# Patient Record
Sex: FEMALE | Race: BLACK OR AFRICAN AMERICAN | NOT HISPANIC OR LATINO | Employment: OTHER | RURAL
[De-identification: names, ages, dates, MRNs, and addresses within clinical notes are randomized per-mention and may not be internally consistent; named-entity substitution may affect disease eponyms.]

---

## 2020-08-13 ENCOUNTER — HISTORICAL (OUTPATIENT)
Dept: ADMINISTRATIVE | Facility: HOSPITAL | Age: 65
End: 2020-08-13

## 2020-08-18 ENCOUNTER — HISTORICAL (OUTPATIENT)
Dept: ADMINISTRATIVE | Facility: HOSPITAL | Age: 65
End: 2020-08-18

## 2021-10-20 ENCOUNTER — HOSPITAL ENCOUNTER (OUTPATIENT)
Dept: RADIOLOGY | Facility: HOSPITAL | Age: 66
Discharge: HOME | End: 2021-10-20
Attending: NURSE PRACTITIONER
Payer: COMMERCIAL

## 2021-10-20 VITALS — HEIGHT: 64 IN | WEIGHT: 250 LBS | BODY MASS INDEX: 42.68 KG/M2

## 2021-10-20 DIAGNOSIS — Z12.31 BREAST CANCER SCREENING BY MAMMOGRAM: ICD-10-CM

## 2021-10-20 PROCEDURE — 77067 SCR MAMMO BI INCL CAD: CPT | Mod: TC

## 2021-10-20 PROCEDURE — 77067 MAMMO DIGITAL SCREENING BILAT: ICD-10-PCS | Mod: 26,,, | Performed by: RADIOLOGY

## 2021-10-20 PROCEDURE — 77067 SCR MAMMO BI INCL CAD: CPT | Mod: 26,,, | Performed by: RADIOLOGY

## 2021-11-18 ENCOUNTER — OFFICE VISIT (OUTPATIENT)
Dept: DERMATOLOGY | Facility: CLINIC | Age: 66
End: 2021-11-18
Payer: COMMERCIAL

## 2021-11-18 VITALS — BODY MASS INDEX: 42.68 KG/M2 | HEIGHT: 64 IN | WEIGHT: 250 LBS | RESPIRATION RATE: 16 BRPM

## 2021-11-18 DIAGNOSIS — L30.1 DYSHIDROTIC ECZEMA: Primary | ICD-10-CM

## 2021-11-18 DIAGNOSIS — R21 RASH AND NONSPECIFIC SKIN ERUPTION: ICD-10-CM

## 2021-11-18 LAB
BACTERIA HYPHAE, POC: NEGATIVE
YEAST, POC: NEGATIVE

## 2021-11-18 PROCEDURE — 99204 OFFICE O/P NEW MOD 45 MIN: CPT | Mod: ,,, | Performed by: STUDENT IN AN ORGANIZED HEALTH CARE EDUCATION/TRAINING PROGRAM

## 2021-11-18 PROCEDURE — 99204 PR OFFICE/OUTPT VISIT, NEW, LEVL IV, 45-59 MIN: ICD-10-PCS | Mod: ,,, | Performed by: STUDENT IN AN ORGANIZED HEALTH CARE EDUCATION/TRAINING PROGRAM

## 2021-11-18 RX ORDER — METFORMIN HYDROCHLORIDE 500 MG/1
TABLET, EXTENDED RELEASE ORAL
COMMUNITY
Start: 2021-08-18

## 2021-11-18 RX ORDER — TRIAMCINOLONE ACETONIDE 1 MG/G
CREAM TOPICAL
COMMUNITY
Start: 2021-06-22

## 2021-11-18 RX ORDER — CETIRIZINE HYDROCHLORIDE 10 MG/1
TABLET ORAL
COMMUNITY
Start: 2021-08-18

## 2021-11-18 RX ORDER — PREDNISONE 10 MG/1
TABLET ORAL
COMMUNITY
Start: 2021-08-18

## 2021-11-18 RX ORDER — ROSUVASTATIN CALCIUM 5 MG/1
TABLET, COATED ORAL
COMMUNITY
Start: 2021-09-15

## 2021-11-18 RX ORDER — AMLODIPINE BESYLATE 10 MG/1
TABLET ORAL
COMMUNITY
Start: 2021-09-08

## 2021-11-18 RX ORDER — CLOBETASOL PROPIONATE 0.5 MG/G
OINTMENT TOPICAL
Qty: 60 G | Refills: 2 | Status: SHIPPED | OUTPATIENT
Start: 2021-11-18 | End: 2023-06-29 | Stop reason: SDUPTHER

## 2021-11-18 RX ORDER — BETAMETHASONE DIPROPIONATE 0.5 MG/G
CREAM TOPICAL
COMMUNITY
Start: 2021-08-18

## 2021-11-18 RX ORDER — MUPIROCIN 20 MG/G
OINTMENT TOPICAL
COMMUNITY
Start: 2021-09-14

## 2021-11-18 RX ORDER — LISINOPRIL AND HYDROCHLOROTHIAZIDE 20; 25 MG/1; MG/1
TABLET ORAL
COMMUNITY
Start: 2021-09-21

## 2022-11-18 ENCOUNTER — HOSPITAL ENCOUNTER (EMERGENCY)
Facility: HOSPITAL | Age: 67
Discharge: HOME | End: 2022-11-18
Payer: COMMERCIAL

## 2022-11-18 VITALS
BODY MASS INDEX: 36.96 KG/M2 | DIASTOLIC BLOOD PRESSURE: 84 MMHG | HEIGHT: 66 IN | RESPIRATION RATE: 15 BRPM | WEIGHT: 230 LBS | TEMPERATURE: 99 F | OXYGEN SATURATION: 95 % | SYSTOLIC BLOOD PRESSURE: 144 MMHG | HEART RATE: 64 BPM

## 2022-11-18 DIAGNOSIS — M79.5 FOREIGN BODY (FB) IN SOFT TISSUE: ICD-10-CM

## 2022-11-18 DIAGNOSIS — R09.A2 FOREIGN BODY SENSATION IN THROAT: Primary | ICD-10-CM

## 2022-11-18 PROCEDURE — 99283 EMERGENCY DEPT VISIT LOW MDM: CPT

## 2022-11-18 PROCEDURE — 99282 EMERGENCY DEPT VISIT SF MDM: CPT

## 2022-11-18 NOTE — ED PROVIDER NOTES
"Encounter Date: 11/18/2022       History     Chief Complaint   Patient presents with    Foreign Body In Throat     Feels like something is stuck in her throat.  Started after she ate soup and peanut butter and jelly sandwich for supper about 6 pm.  Able to swallow saliva.  No shortness of breath.     Arianna Galicia is a 66 yo AAF that presents with c/o foreign body sensation in throat since 1800 yesterday.  Pt states she ate soup with neck bones and a peanut butter and jelly sandwich.  When she was eating the last bite of her sandwich is when she felt the foreign body sensation and vomited.  States she does not think she swallowed any of the neck bone.  Pt denies CP, SOB, or difficulty swallowing.  Does report a cough but reports that is chronic.  States she has been drinking fluids without difficulty.  Able to speak in full sentences without difficulty.  No difficulty managing oral secretions and reports no change in her voice.  She denies throat being sore just states "there is something stuck in my throat".    The history is provided by the patient.   Review of patient's allergies indicates:  No Known Allergies  Past Medical History:   Diagnosis Date    Diabetes mellitus, type 2     HTN (hypertension)      Past Surgical History:   Procedure Laterality Date    HYSTERECTOMY       Family History   Problem Relation Age of Onset    Kidney cancer Mother     Diabetes Sister     Heart disease Sister      Social History     Tobacco Use    Smoking status: Never    Smokeless tobacco: Never   Substance Use Topics    Alcohol use: Never    Drug use: Never     Review of Systems   Constitutional: Negative.    HENT:  Negative for drooling, facial swelling, postnasal drip, rhinorrhea, sore throat, trouble swallowing and voice change.    Eyes: Negative.    Respiratory:  Positive for cough (chronic). Negative for chest tightness, shortness of breath, wheezing and stridor.    Cardiovascular:  Negative for chest pain. "   Gastrointestinal:  Positive for vomiting (after eating yesterday). Negative for diarrhea and nausea.   Endocrine: Negative.    Genitourinary: Negative.    Musculoskeletal: Negative.    Allergic/Immunologic: Negative for environmental allergies and food allergies.   Neurological:  Negative for dizziness, syncope, speech difficulty, weakness, light-headedness and numbness.   Psychiatric/Behavioral: Negative.       Physical Exam     Initial Vitals [11/18/22 0444]   BP Pulse Resp Temp SpO2   (!) 189/72 (!) 55 14 98.5 °F (36.9 °C) 95 %      MAP       --         Physical Exam    Constitutional: She appears well-developed and well-nourished. She is not diaphoretic. No distress.   HENT:   Head: Normocephalic and atraumatic.   Right Ear: External ear normal.   Left Ear: External ear normal.   Nose: Nose normal.   Mouth/Throat: Uvula is midline, oropharynx is clear and moist and mucous membranes are normal. No oral lesions. No uvula swelling. No oropharyngeal exudate, posterior oropharyngeal edema or posterior oropharyngeal erythema.   Eyes: EOM are normal. Pupils are equal, round, and reactive to light.   Neck: Neck supple. No tracheal deviation present.   Normal range of motion.  Cardiovascular:  Regular rhythm, normal heart sounds and intact distal pulses.           Pulmonary/Chest: Breath sounds normal. No stridor. No respiratory distress. She has no wheezes.   Abdominal: Abdomen is soft. Bowel sounds are normal.   Musculoskeletal:         General: Normal range of motion.      Cervical back: Normal range of motion and neck supple.     Lymphadenopathy:     She has no cervical adenopathy.   Neurological: She is alert and oriented to person, place, and time. She has normal strength. GCS score is 15. GCS eye subscore is 4. GCS verbal subscore is 5. GCS motor subscore is 6.   Skin: Skin is warm and dry.   Psychiatric: She has a normal mood and affect. Her behavior is normal. Judgment and thought content normal.       Medical  Screening Exam   See Full Note    ED Course   Procedures  Labs Reviewed - No data to display       Imaging Results              X-Ray Neck Soft Tissue (In process)                      Medications - No data to display  Medical Decision Making:   ED Management:  -Will perform soft tissue neck to rule out foreign body                    - xray no fracture of dislocation, no tracheal stenosis                 Clinical Impression:   Final diagnoses:  [M79.5] Foreign body (FB) in soft tissue - possible/rule out  [R09.89] Foreign body sensation in throat (Primary)        ED Disposition Condition    Discharge Stable          ED Prescriptions    None       Follow-up Information       Follow up With Specialties Details Why Contact Info    Mary Roe NP Family Medicine   [address] [place] [address] Ext  [address]  [place] [address]  [phone]               Marcelina Calvert MD  11/18/22 0698

## 2022-11-19 ENCOUNTER — TELEPHONE (OUTPATIENT)
Dept: EMERGENCY MEDICINE | Facility: HOSPITAL | Age: 67
End: 2022-11-19
Payer: COMMERCIAL

## 2022-11-19 NOTE — TELEPHONE ENCOUNTER
Patient contacted for f/u to ED visit. States they are feeling better. Instructed to follow up with PCP if needed.

## 2023-01-25 ENCOUNTER — HOSPITAL ENCOUNTER (OUTPATIENT)
Dept: RADIOLOGY | Facility: HOSPITAL | Age: 68
Discharge: HOME | End: 2023-01-25
Attending: NURSE PRACTITIONER
Payer: MEDICARE

## 2023-01-25 DIAGNOSIS — Z12.31 BREAST CANCER SCREENING BY MAMMOGRAM: ICD-10-CM

## 2023-01-25 PROCEDURE — 77067 SCR MAMMO BI INCL CAD: CPT | Mod: TC

## 2023-06-29 ENCOUNTER — OFFICE VISIT (OUTPATIENT)
Dept: DERMATOLOGY | Facility: CLINIC | Age: 68
End: 2023-06-29
Payer: MEDICARE

## 2023-06-29 DIAGNOSIS — L30.1 DYSHIDROTIC ECZEMA: ICD-10-CM

## 2023-06-29 PROCEDURE — 99213 OFFICE O/P EST LOW 20 MIN: CPT | Mod: ,,, | Performed by: STUDENT IN AN ORGANIZED HEALTH CARE EDUCATION/TRAINING PROGRAM

## 2023-06-29 PROCEDURE — 1159F PR MEDICATION LIST DOCUMENTED IN MEDICAL RECORD: ICD-10-PCS | Mod: CPTII,,, | Performed by: STUDENT IN AN ORGANIZED HEALTH CARE EDUCATION/TRAINING PROGRAM

## 2023-06-29 PROCEDURE — 4010F PR ACE/ARB THEARPY RXD/TAKEN: ICD-10-PCS | Mod: CPTII,,, | Performed by: STUDENT IN AN ORGANIZED HEALTH CARE EDUCATION/TRAINING PROGRAM

## 2023-06-29 PROCEDURE — 1159F MED LIST DOCD IN RCRD: CPT | Mod: CPTII,,, | Performed by: STUDENT IN AN ORGANIZED HEALTH CARE EDUCATION/TRAINING PROGRAM

## 2023-06-29 PROCEDURE — 4010F ACE/ARB THERAPY RXD/TAKEN: CPT | Mod: CPTII,,, | Performed by: STUDENT IN AN ORGANIZED HEALTH CARE EDUCATION/TRAINING PROGRAM

## 2023-06-29 PROCEDURE — 99213 PR OFFICE/OUTPT VISIT, EST, LEVL III, 20-29 MIN: ICD-10-PCS | Mod: ,,, | Performed by: STUDENT IN AN ORGANIZED HEALTH CARE EDUCATION/TRAINING PROGRAM

## 2023-06-29 RX ORDER — CLOBETASOL PROPIONATE 0.5 MG/G
OINTMENT TOPICAL
Qty: 60 G | Refills: 2 | Status: SHIPPED | OUTPATIENT
Start: 2023-06-29

## 2023-06-29 NOTE — PROGRESS NOTES
[facility]  Sonu Garcia MD    [address] [place] [address], [place] [address]  [phone]    Fax: [phone]    Patient Name: Arianna Galicia  Medical Record Number: [id number]  PCP: Mary Roe NP  Age: 68 y.o. : 1955  Contact: [phone] (home)     History of Present Illness:     Arianna Galicia is a 68 y.o.  female here for follow up of dyshidrotic eczema. Last seen 21 current therapies includes clobetasol which did improve initially. She has been accidentally using mupirocin instead of clobetasol so it has not improved recently.     The patient has no other concerns today.    Review of Systems:     Unremarkable other than mentioned above.     Physical Exam:     General: Relaxed, oriented, alert    Skin examination of the scalp, face, neck, chest, back, abdomen, upper extremities and lower extremities were normal except for as listed below      Assessment and Plan:     1. Dyshidrotic Eczema   - tapioca-like vesicles, fissures, and erythematous eczematous patches on the hands and feet    Plan:  Continue clobetasol to hand BID under occlusion     Counseling  Skin care: Patient should wash using lukewarm water with a mild cleanser and moisturize immediately after. Emollients should be applied at least 2-3 times daily. Avoid scented detergents or fabric softeners. Keep fingernails short. Avoid excessive hand washing.  Expectations: The patient is aware that dyshidrotic eczema is chronic in nature and can improve with moisturizers and topical steroids and worsen with stress, scented soaps, detergents, scratching, dry skin, changes in weather and skin infections.  Contact office if: Dyshidrotic eczema worsens or fails to improve despite several weeks of treatment; patient develops skin infections (such as: yellow honey colored crusts or cold sores).           Return to clinic PRN    AVS printed with patient instructions     Sonu Garcia MD   Mohs  Surgery/Dermatologic Oncology  Dermatology

## 2023-07-19 ENCOUNTER — CLINICAL SUPPORT (OUTPATIENT)
Dept: DERMATOLOGY | Facility: CLINIC | Age: 68
End: 2023-07-19
Payer: MEDICARE

## 2023-07-19 DIAGNOSIS — L30.1 DYSHIDROTIC ECZEMA: Primary | ICD-10-CM

## 2023-07-19 PROCEDURE — 99213 OFFICE O/P EST LOW 20 MIN: CPT | Mod: ,,, | Performed by: STUDENT IN AN ORGANIZED HEALTH CARE EDUCATION/TRAINING PROGRAM

## 2023-07-19 PROCEDURE — 99213 PR OFFICE/OUTPT VISIT, EST, LEVL III, 20-29 MIN: ICD-10-PCS | Mod: ,,, | Performed by: STUDENT IN AN ORGANIZED HEALTH CARE EDUCATION/TRAINING PROGRAM

## 2023-07-19 NOTE — PROGRESS NOTES
[facility]  Sonu Garcia MD    [address] [address]  [phone]    Fax: [phone]    Patient Name: Arianna Galicia  Medical Record Number: [id number]  PCP: Mary Roe NP  Age: 68 y.o. : 1955  Contact: [phone] (home)     History of Present Illness:     Arianna Galicia is a 68 y.o.  female here for follow up of dyshidrotic eczema. She has been using clobetasol for the past few weeks with minimal improvement. However, she does use hand sanitizers, cleaning chemicals, and dish soap without gloves.     The patient has no other concerns today.    Review of Systems:     Unremarkable other than mentioned above.     Physical Exam:     General: Relaxed, oriented, alert    Skin examination of the scalp, face, neck, chest, back, abdomen, upper extremities and lower extremities were normal except for as listed below      Assessment and Plan:     1. Dyshidrotic Eczema   - tapioca-like vesicles, fissures, and erythematous eczematous patches on the hands and feet    Plan:  - stop use of dish soap and hand sanitizers   - clobetasol bid PRN  - update us in 3 weeks regarding improvement      Counseling  Skin care: Patient should wash using lukewarm water with a mild cleanser and moisturize immediately after. Emollients should be applied at least 2-3 times daily. Avoid scented detergents or fabric softeners. Keep fingernails short. Avoid excessive hand washing.  Expectations: The patient is aware that dyshidrotic eczema is chronic in nature and can improve with moisturizers and topical steroids and worsen with stress, scented soaps, detergents, scratching, dry skin, changes in weather and skin infections.  Contact office if: Dyshidrotic eczema worsens or fails to improve despite several weeks of treatment; patient develops skin infections (such as: yellow honey colored crusts or cold sores).        Return to clinic in 3 months     AVS printed with patient  instructions     Sonu Garcia MD   Mohs Surgery/Dermatologic Oncology  Dermatology

## 2024-01-16 DIAGNOSIS — M54.50 LOW BACK PAIN: Primary | ICD-10-CM

## 2024-01-31 ENCOUNTER — HOSPITAL ENCOUNTER (OUTPATIENT)
Dept: RADIOLOGY | Facility: HOSPITAL | Age: 69
Discharge: HOME | End: 2024-01-31
Attending: NURSE PRACTITIONER
Payer: MEDICARE

## 2024-01-31 VITALS — BODY MASS INDEX: 40.18 KG/M2 | HEIGHT: 66 IN | WEIGHT: 250 LBS

## 2024-01-31 DIAGNOSIS — Z12.31 ENCOUNTER FOR SCREENING MAMMOGRAM FOR MALIGNANT NEOPLASM OF BREAST: ICD-10-CM

## 2024-01-31 PROCEDURE — 77067 SCR MAMMO BI INCL CAD: CPT | Mod: TC

## 2024-02-14 ENCOUNTER — HOSPITAL ENCOUNTER (OUTPATIENT)
Dept: RADIOLOGY | Facility: HOSPITAL | Age: 69
Discharge: HOME | End: 2024-02-14
Attending: NURSE PRACTITIONER
Payer: MEDICARE

## 2024-02-14 ENCOUNTER — OFFICE VISIT (OUTPATIENT)
Dept: SPINE | Facility: CLINIC | Age: 69
End: 2024-02-14
Payer: MEDICARE

## 2024-02-14 DIAGNOSIS — M25.551 BILATERAL HIP PAIN: Primary | ICD-10-CM

## 2024-02-14 DIAGNOSIS — M25.552 BILATERAL HIP PAIN: Primary | ICD-10-CM

## 2024-02-14 DIAGNOSIS — M54.16 LUMBAR RADICULOPATHY: ICD-10-CM

## 2024-02-14 DIAGNOSIS — M25.552 BILATERAL HIP PAIN: ICD-10-CM

## 2024-02-14 DIAGNOSIS — M25.551 BILATERAL HIP PAIN: ICD-10-CM

## 2024-02-14 DIAGNOSIS — M54.16 LUMBAR RADICULOPATHY: Primary | ICD-10-CM

## 2024-02-14 PROCEDURE — 72110 X-RAY EXAM L-2 SPINE 4/>VWS: CPT | Mod: TC

## 2024-02-14 PROCEDURE — 73522 X-RAY EXAM HIPS BI 3-4 VIEWS: CPT | Mod: TC

## 2024-02-14 PROCEDURE — 73522 X-RAY EXAM HIPS BI 3-4 VIEWS: CPT | Mod: 26,,, | Performed by: RADIOLOGY

## 2024-02-14 PROCEDURE — 99204 OFFICE O/P NEW MOD 45 MIN: CPT | Mod: S$PBB,,, | Performed by: NURSE PRACTITIONER

## 2024-02-14 PROCEDURE — 72110 X-RAY EXAM L-2 SPINE 4/>VWS: CPT | Mod: 26,,, | Performed by: STUDENT IN AN ORGANIZED HEALTH CARE EDUCATION/TRAINING PROGRAM

## 2024-02-14 PROCEDURE — 99213 OFFICE O/P EST LOW 20 MIN: CPT | Mod: PBBFAC,25 | Performed by: NURSE PRACTITIONER

## 2024-02-14 PROCEDURE — 4010F ACE/ARB THERAPY RXD/TAKEN: CPT | Mod: CPTII,,, | Performed by: NURSE PRACTITIONER

## 2024-02-14 PROCEDURE — 1159F MED LIST DOCD IN RCRD: CPT | Mod: CPTII,,, | Performed by: NURSE PRACTITIONER

## 2024-02-14 NOTE — PROGRESS NOTES
MDM/time:  Greater than 45 minutes spent on this encounter including 15 minutes reviewing imaging and notes, 20 minutes with the patient, 10 minutes documentation    ASSESSMENT:  68 y.o. female with thoracolumbar degenerative scoliosis with lumbar spondylolisthesis at L3-4 and radiculopathy, severe right hip OA and mod-severe Left hip OA    PLAN:  Referral to Orthopedic for bilateral hip OA - discuss surgical options   Follow up 3 months     HPI:  68 y.o. female here for evaluation of right-sided hip/groin pain over the past year.  Patient denies injury but reports approximately a year ago pain started and has progressed since that time.  She is now having to use a cane to ambulate due to pain in unstable gait.  Patient denies difficulty with  strength.  No bladder bowel incontinence.  Unable to stand or walk for any length of time due to pain in the right hip and groin area.  Currently takes meloxicam as needed for pain.  No recent physical therapy.  No prior pain management.  No recent MRI.  No prior spine surgery.  Patient is not a smoker.    IMAGING:  X-Ray Lumbar 4-5 View including Bending Views  Narrative: EXAMINATION:  XR LUMBAR SPINE 4-5 VIEW WITH BENDING VIEWS    CLINICAL HISTORY:  Radiculopathy, lumbar region    TECHNIQUE:  XR LUMBAR SPINE 4-5 VIEW WITH BENDING VIEWS    COMPARISON:  2015    FINDINGS:  No acute fracture or dislocation.  No dynamic instability.  Grade 1 retrolisthesis L3 on L4 similar on the flexion and extension views, unchanged from 2015 comparison.  Mild rightward scoliosis.  Moderate to severe multilevel degenerative disc disease, progressed from comparison.  Multiple anterior osteophytes, similar.  Multilevel endplate change, similar.  Multilevel moderate spinal canal narrowing, similar.  Multilevel neural foraminal narrowing, grossly similar to comparison.  Impression: No acute fracture or dislocation.  No dynamic instability.  Grade 1 retrolisthesis L3 on L4 similar on the  flexion and extension views, unchanged from 2015 comparison. Mild rightward scoliosis.  Moderate to severe multilevel degenerative disc disease, progressed from comparison. Multiple anterior osteophytes, similar. Multilevel endplate change, similar. Multilevel moderate spinal canal narrowing, similar. Multilevel neural foraminal narrowing, grossly similar to comparison.    Electronically signed by: Nilo Singleton  Date:    02/14/2024  Time:    15:16  X-Ray Hip 3 or 4 views Bilateral  Narrative: EXAMINATION:  XR HIP 3 OR 4 VIEWS BILATERAL    CLINICAL HISTORY:  Pain in right hip    COMPARISON:  None available    TECHNIQUE:  XR HIP 4 VIEWS BILATERAL    FINDINGS:  No evidence of fracture seen.  The alignment of the joints appears normal.  Moderate to severe right hip and mild-to-moderate left hip degenerative change is present.  No soft tissue abnormality is seen.  Impression: Hip osteoarthrosis as described above.    Electronically signed by: Ismael Tracy  Date:    02/14/2024  Time:    14:54     X-Ray Lumbar 4-5 View including Bending Views  Narrative: EXAMINATION:  XR LUMBAR SPINE 4-5 VIEW WITH BENDING VIEWS    CLINICAL HISTORY:  Radiculopathy, lumbar region    TECHNIQUE:  XR LUMBAR SPINE 4-5 VIEW WITH BENDING VIEWS    COMPARISON:  2015    FINDINGS:  No acute fracture or dislocation.  No dynamic instability.  Grade 1 retrolisthesis L3 on L4 similar on the flexion and extension views, unchanged from 2015 comparison.  Mild rightward scoliosis.  Moderate to severe multilevel degenerative disc disease, progressed from comparison.  Multiple anterior osteophytes, similar.  Multilevel endplate change, similar.  Multilevel moderate spinal canal narrowing, similar.  Multilevel neural foraminal narrowing, grossly similar to comparison.  Impression: No acute fracture or dislocation.  No dynamic instability.  Grade 1 retrolisthesis L3 on L4 similar on the flexion and extension views, unchanged from 2015 comparison. Mild  rightward scoliosis.  Moderate to severe multilevel degenerative disc disease, progressed from comparison. Multiple anterior osteophytes, similar. Multilevel endplate change, similar. Multilevel moderate spinal canal narrowing, similar. Multilevel neural foraminal narrowing, grossly similar to comparison.    Electronically signed by: Nilo Singleton  Date:    02/14/2024  Time:    15:16  X-Ray Hip 3 or 4 views Bilateral  Narrative: EXAMINATION:  XR HIP 3 OR 4 VIEWS BILATERAL    CLINICAL HISTORY:  Pain in right hip    COMPARISON:  None available    TECHNIQUE:  XR HIP 4 VIEWS BILATERAL    FINDINGS:  No evidence of fracture seen.  The alignment of the joints appears normal.  Moderate to severe right hip and mild-to-moderate left hip degenerative change is present.  No soft tissue abnormality is seen.  Impression: Hip osteoarthrosis as described above.    Electronically signed by: Ismael Tracy  Date:    02/14/2024  Time:    14:54         Past Medical History:   Diagnosis Date    Diabetes mellitus, type 2     HTN (hypertension)      Past Surgical History:   Procedure Laterality Date    HYSTERECTOMY      OOPHORECTOMY       Social History     Tobacco Use    Smoking status: Never    Smokeless tobacco: Never   Substance Use Topics    Alcohol use: Never    Drug use: Never      Current Outpatient Medications   Medication Instructions    amLODIPine (NORVASC) 10 MG tablet No dose, route, or frequency recorded.    betamethasone dipropionate 0.05 % cream No dose, route, or frequency recorded.    cetirizine (ZYRTEC) 10 MG tablet No dose, route, or frequency recorded.    clobetasol 0.05% (TEMOVATE) 0.05 % Oint Apply to rash on hands twice a day tapering with improvement    lisinopriL-hydrochlorothiazide (PRINZIDE,ZESTORETIC) 20-25 mg Tab No dose, route, or frequency recorded.    metFORMIN (GLUCOPHAGE-XR) 500 MG ER 24hr tablet No dose, route, or frequency recorded.    mupirocin (BACTROBAN) 2 % ointment No dose, route, or frequency  recorded.    predniSONE (DELTASONE) 10 MG tablet No dose, route, or frequency recorded.    rosuvastatin (CRESTOR) 5 MG tablet No dose, route, or frequency recorded.    triamcinolone acetonide 0.1% (KENALOG) 0.1 % cream No dose, route, or frequency recorded.        EXAM:  Constitutional  General Appearance:  There is no height or weight on file to calculate BMI., NAD  Psychiatric   Orientation: Oriented to time, oriented to place, oriented to person  Mood and Affect: Active and alert, normal mood, normal affect  Gait and Station   Appearance:  Antalgic gait to the right walks with a cane, unable to tandem gait, unable to walk on toes, unable to walk on heels    LUMBAR  Musculoskeletal System   Hips: Normal appearance, no leg length discrepancy, decreased motion; left, painful decreased motion; right    Lumbar Spine                   Inspection:  Normal alignment, normal sagittal balance                  Range of motion: Normal flexion, extension, lateral bending, rotation. No pain with range of motion                  Bony Palpation of the Lumbar Spine:  No tenderness of the spinous process, no tenderness of the sacrum, no tenderness of the coccyx                  Bony Palpation of the Right Hip:  No tenderness of the iliac crest, no tenderness of the sciatic notch, no tenderness of the SI joint                  Bony Palpation of the Left Hip:  No tenderness of the iliac crest, no tenderness of the sciatic notch, no tenderness of the SI joint                  Soft Tissue Palpation on the Right:  No tenderness of the paraspinal region, no tenderness of the iliolumbar region                  Soft Tissue Palpation on the Left:  No tenderness of the paraspinal region, no tenderness of the iliolumbar region    Motor Strength   L1 Right:  Hip flexion iliopsoas 3/5    L1 Left:  Hip flexion iliopsoas 4/5              L2-L4 Right:  Knee extension quadriceps 5/5, tibialis anterior 5/5              L2-L4 Left:  Knee extension  quadriceps 5/5, tibialis anterior 5/5   L5 Right:  Extensor hallucis llongus 5/5,    L5 Left:  Extensor hallucis longus 5/5,    S1 Right:  Plantar flexion gastrocnemius 5/5   S1 Left:  Plantar flexion gastrocnemius 5/5    Neurological System   Ankle Reflex Right:  normal   Ankle Reflex Left: normal   Knee Reflex Right:  normal   Knee Reflex Left:  normal   Sensation on the Right:  L2 normal, L3 normal, L4 normal, L5 normal, S1 normal   Sensation on the Left:  L2 normal, L3 normal, L4 normal, L5 normal, S1 normal              Special Test on the Right:  Seated straight leg raising test negative, no clonus of the ankle              Special Test on the Left:  Seated straight leg raising test negative, no clonus of the ankle    Skin   Lumbosacral Spine:  Normal skin    Cardiovascular System   Arterial Pulses Right:  Posterior tibialis normal, dorsalis pedis normal   Arterial Pulses Left:  Posterior tibialis normal, dorsalis pedis normal   Edema Right: None   Edema Left:  None

## 2024-02-20 ENCOUNTER — OFFICE VISIT (OUTPATIENT)
Dept: ORTHOPEDICS | Facility: CLINIC | Age: 69
End: 2024-02-20
Payer: MEDICARE

## 2024-02-20 DIAGNOSIS — M25.552 BILATERAL HIP PAIN: Primary | ICD-10-CM

## 2024-02-20 DIAGNOSIS — M25.511 RIGHT SHOULDER PAIN, UNSPECIFIED CHRONICITY: ICD-10-CM

## 2024-02-20 DIAGNOSIS — M25.551 BILATERAL HIP PAIN: Primary | ICD-10-CM

## 2024-02-20 DIAGNOSIS — M25.552 BILATERAL HIP PAIN: ICD-10-CM

## 2024-02-20 DIAGNOSIS — M16.0 PRIMARY OSTEOARTHRITIS OF BOTH HIPS: Primary | ICD-10-CM

## 2024-02-20 DIAGNOSIS — M25.551 BILATERAL HIP PAIN: ICD-10-CM

## 2024-02-20 PROCEDURE — 99203 OFFICE O/P NEW LOW 30 MIN: CPT | Mod: S$PBB,,, | Performed by: NURSE PRACTITIONER

## 2024-02-20 PROCEDURE — 4010F ACE/ARB THERAPY RXD/TAKEN: CPT | Mod: CPTII,,, | Performed by: NURSE PRACTITIONER

## 2024-02-20 PROCEDURE — 99212 OFFICE O/P EST SF 10 MIN: CPT | Mod: PBBFAC | Performed by: NURSE PRACTITIONER

## 2024-02-20 NOTE — PROGRESS NOTES
HPI:   Arianna Galicia is a pleasant 68 y.o. patient who reports to clinic for evaluation of bilateral hip pain, right being the worst.  She has had right hip pain for close to a year.  No previous treatment.  She has taken Mobic occasionally off and on.  Reports most of her pain is in her right groin.  It is very painful to walk.  Any rotation of the hip is painful..     Injury onset and description: none  Patient's occupation:   This is not a work related injury.   This injury has been non-responsive to conservative care. The pain is worse with repetitive use, and strenuous activity is very difficult.  her pain improves with rest.   PAST MEDICAL HISTORY:   Past Medical History:   Diagnosis Date    Diabetes mellitus, type 2     HTN (hypertension)      PAST SURGICAL HISTORY:   Past Surgical History:   Procedure Laterality Date    HYSTERECTOMY      OOPHORECTOMY       MEDICATIONS:    Current Outpatient Medications:     amLODIPine (NORVASC) 10 MG tablet, , Disp: , Rfl:     betamethasone dipropionate 0.05 % cream, , Disp: , Rfl:     cetirizine (ZYRTEC) 10 MG tablet, , Disp: , Rfl:     clobetasol 0.05% (TEMOVATE) 0.05 % Oint, Apply to rash on hands twice a day tapering with improvement, Disp: 60 g, Rfl: 2    lisinopriL-hydrochlorothiazide (PRINZIDE,ZESTORETIC) 20-25 mg Tab, , Disp: , Rfl:     metFORMIN (GLUCOPHAGE-XR) 500 MG ER 24hr tablet, , Disp: , Rfl:     mupirocin (BACTROBAN) 2 % ointment, , Disp: , Rfl:     predniSONE (DELTASONE) 10 MG tablet, , Disp: , Rfl:     rosuvastatin (CRESTOR) 5 MG tablet, , Disp: , Rfl:     triamcinolone acetonide 0.1% (KENALOG) 0.1 % cream, , Disp: , Rfl:   ALLERGIES:   Review of patient's allergies indicates:  No Known Allergies      PHYSICAL EXAM:  VITAL SIGNS: There were no vitals taken for this visit.  General: Well-developed well-nourished 68 y.o. femalein no acute distress;Cardiovascular: Regular rhythm by palpation of distal pulse, normal color and temperature, no concerning  varicosities on symptomatic side Lungs: No labored breathing or wheezing appreciated Neuro: Alert and oriented ×3 Psychiatric: well oriented to person, place and time, demonstrates normal mood and affect Skin: No rashes, lesions or ulcers, normal temperature, turgor, and texture on uninvolved extremity    General    Constitutional: She is oriented to person, place, and time. She appears well-nourished.   HENT:   Head: Normocephalic and atraumatic.   Eyes: EOM are normal. Pupils are equal, round, and reactive to light.   Neck: Neck supple.   Cardiovascular:  Normal rate, regular rhythm and intact distal pulses.            Pulmonary/Chest: Effort normal. No respiratory distress.   Abdominal: There is no abdominal tenderness. There is no guarding.   Neurological: She is alert and oriented to person, place, and time. She has normal reflexes. She displays normal reflexes. No cranial nerve deficit. She exhibits normal muscle tone. Coordination normal.   Psychiatric: She has a normal mood and affect. Her behavior is normal. Judgment and thought content normal.     General Musculoskeletal Exam   Pelvic Obliquity: none        Right Hip Exam     Inspection   Deformity of hip.    Range of Motion   Extension:  abnormal   Flexion:  abnormal   External rotation:  abnormal   Internal rotation:  abnormal     Tests   Pain w/ forced internal rotation (NISHA): present  Pain w/ forced external rotation (FADIR): present  Circumduction test: positive  Log Roll: positive    Other   Sensation: normal  Back (L-Spine & T-Spine) / Neck (C-Spine) Exam   Back exam is normal.    Back (L-Spine & T-Spine) Range of Motion   The patient has abnormal back ROM.      Vascular Exam     Right Pulses    Posterior Tibial:      2+        IMAGING:  X-Ray Lumbar 4-5 View including Bending Views    Result Date: 2/14/2024  EXAMINATION: XR LUMBAR SPINE 4-5 VIEW WITH BENDING VIEWS CLINICAL HISTORY: Radiculopathy, lumbar region TECHNIQUE: XR LUMBAR SPINE 4-5 VIEW  WITH BENDING VIEWS COMPARISON: 2015 FINDINGS: No acute fracture or dislocation.  No dynamic instability.  Grade 1 retrolisthesis L3 on L4 similar on the flexion and extension views, unchanged from 2015 comparison.  Mild rightward scoliosis.  Moderate to severe multilevel degenerative disc disease, progressed from comparison.  Multiple anterior osteophytes, similar.  Multilevel endplate change, similar.  Multilevel moderate spinal canal narrowing, similar.  Multilevel neural foraminal narrowing, grossly similar to comparison.     No acute fracture or dislocation.  No dynamic instability.  Grade 1 retrolisthesis L3 on L4 similar on the flexion and extension views, unchanged from 2015 comparison. Mild rightward scoliosis.  Moderate to severe multilevel degenerative disc disease, progressed from comparison. Multiple anterior osteophytes, similar. Multilevel endplate change, similar. Multilevel moderate spinal canal narrowing, similar. Multilevel neural foraminal narrowing, grossly similar to comparison. Electronically signed by: Nilo Singleton Date:    02/14/2024 Time:    15:16    X-Ray Hip 3 or 4 views Bilateral    Result Date: 2/14/2024  EXAMINATION: XR HIP 3 OR 4 VIEWS BILATERAL CLINICAL HISTORY: Pain in right hip COMPARISON: None available TECHNIQUE: XR HIP 4 VIEWS BILATERAL FINDINGS: No evidence of fracture seen.  The alignment of the joints appears normal.  Moderate to severe right hip and mild-to-moderate left hip degenerative change is present.  No soft tissue abnormality is seen.     Hip osteoarthrosis as described above. Electronically signed by: Ismael Tracy Date:    02/14/2024 Time:    14:54    Mammo Digital Screening Bilat w/ Frandy    Result Date: 2/1/2024  Result: Mammo Digital Screening Bilat w/ Frandy  History: Patient is 68 y.o. and is seen for a screening mammogram. Films Compared: Compared to: 01/25/2023 Mammo Digital Screening Bilat and 10/20/2021 Mammo Digital Screening Bilat  Findings: This  procedure was performed using tomosynthesis. Computer-aided detection was utilized in the interpretation of this examination. The breasts have scattered areas of fibroglandular density. There is no evidence of suspicious masses, calcifications, or other abnormal findings.     Bilateral There is no mammographic evidence of malignancy. BI-RADS Category: Overall: 1 - Negative  Recommendation: Routine screening mammogram in 1 year is recommended. Your estimated lifetime risk of breast cancer (to age 85) based on Tyrer-Cuzick risk assessment model is Tyrer-Cuzick: 3.41 %. According to the [employer], patients with a lifetime breast cancer risk of 20% or higher might benefit from supplemental screening tests.         ASSESSMENT:      ICD-10-CM ICD-9-CM   1. Primary osteoarthritis of both hips  M16.0 715.15   2. Bilateral hip pain  M25.551 719.45    M25.552        PLAN:     -Findings and treatment options were discussed with the patient  -All questions answered  Right hip intra-articular injection in Radiology.  Return to clinic 3 months for recheck.  There are no Patient Instructions on file for this visit.  No orders of the defined types were placed in this encounter.    Procedures

## 2024-02-28 ENCOUNTER — HOSPITAL ENCOUNTER (OUTPATIENT)
Dept: RADIOLOGY | Facility: HOSPITAL | Age: 69
Discharge: HOME | End: 2024-02-28
Attending: NURSE PRACTITIONER
Payer: MEDICARE

## 2024-02-28 DIAGNOSIS — M25.552 BILATERAL HIP PAIN: ICD-10-CM

## 2024-02-28 DIAGNOSIS — M25.551 BILATERAL HIP PAIN: ICD-10-CM

## 2024-02-28 PROCEDURE — 25500020 PHARM REV CODE 255: Performed by: STUDENT IN AN ORGANIZED HEALTH CARE EDUCATION/TRAINING PROGRAM

## 2024-02-28 PROCEDURE — 63600175 PHARM REV CODE 636 W HCPCS: Mod: JZ,JG | Performed by: STUDENT IN AN ORGANIZED HEALTH CARE EDUCATION/TRAINING PROGRAM

## 2024-02-28 PROCEDURE — 77002 NEEDLE LOCALIZATION BY XRAY: CPT | Mod: TC

## 2024-02-28 RX ORDER — TRIAMCINOLONE ACETONIDE 40 MG/ML
40 INJECTION, SUSPENSION INTRA-ARTICULAR; INTRAMUSCULAR ONCE
Status: COMPLETED | OUTPATIENT
Start: 2024-02-28 | End: 2024-02-28

## 2024-02-28 RX ORDER — BUPIVACAINE HYDROCHLORIDE 5 MG/ML
3 INJECTION, SOLUTION EPIDURAL; INTRACAUDAL ONCE
Status: COMPLETED | OUTPATIENT
Start: 2024-02-28 | End: 2024-02-28

## 2024-02-28 RX ADMIN — BUPIVACAINE HYDROCHLORIDE 15 MG: 5 INJECTION, SOLUTION EPIDURAL; INTRACAUDAL; PERINEURAL at 11:02

## 2024-02-28 RX ADMIN — IOPAMIDOL 3 ML: 612 INJECTION, SOLUTION INTRAVENOUS at 10:02

## 2024-02-28 RX ADMIN — TRIAMCINOLONE ACETONIDE 40 MG: 40 INJECTION, SUSPENSION INTRA-ARTICULAR; INTRAMUSCULAR at 11:02

## 2024-02-28 NOTE — PROCEDURES
Hip arthrogram injection performed Ever DIAZ under the supervision of Dr. Singleton. Patient was prepped with ChloraPrep and draped in a sterile fashion.  Formal time-out was called with all present in agreement 6 cc of 1% lidocaine infused.  The right hip was accessed with a 22 gauge needle with 3 cc of Isovue-300 infused.  40 mg of Kenalog and 3 cc of Marcaine then infused.  Needle withdrawn pressure held on the puncture site.  Bandage then placed.  Patient tolerated procedure well with no immediate complication.

## 2024-06-05 ENCOUNTER — HOSPITAL ENCOUNTER (OUTPATIENT)
Dept: RADIOLOGY | Facility: HOSPITAL | Age: 69
Discharge: HOME | End: 2024-06-05
Attending: NURSE PRACTITIONER
Payer: MEDICARE

## 2024-06-05 DIAGNOSIS — Z01.812 PRE-PROCEDURE LAB EXAM: Primary | ICD-10-CM

## 2024-06-05 DIAGNOSIS — R10.9 ABDOMINAL PAIN: ICD-10-CM

## 2024-06-05 DIAGNOSIS — M25.559 HIP PAIN: ICD-10-CM

## 2024-06-05 PROCEDURE — 73502 X-RAY EXAM HIP UNI 2-3 VIEWS: CPT | Mod: TC,RT

## 2024-06-05 PROCEDURE — 25500020 PHARM REV CODE 255

## 2024-06-05 PROCEDURE — 74177 CT ABD & PELVIS W/CONTRAST: CPT | Mod: TC

## 2024-06-05 RX ADMIN — IOPAMIDOL 100 ML: 755 INJECTION, SOLUTION INTRAVENOUS at 09:06

## 2024-06-06 ENCOUNTER — HOSPITAL ENCOUNTER (EMERGENCY)
Facility: HOSPITAL | Age: 69
Discharge: HOME | End: 2024-06-07
Payer: MEDICARE

## 2024-06-06 DIAGNOSIS — R10.9 ABDOMINAL PAIN, UNSPECIFIED ABDOMINAL LOCATION: Primary | ICD-10-CM

## 2024-06-06 DIAGNOSIS — R11.0 NAUSEA: ICD-10-CM

## 2024-06-06 LAB
ALBUMIN SERPL BCP-MCNC: 3.8 G/DL (ref 3.5–5)
ALBUMIN/GLOB SERPL: 0.8 {RATIO}
ALP SERPL-CCNC: 80 U/L (ref 55–142)
ALT SERPL W P-5'-P-CCNC: 18 U/L (ref 13–56)
ANION GAP SERPL CALCULATED.3IONS-SCNC: 15 MMOL/L (ref 7–16)
AST SERPL W P-5'-P-CCNC: 13 U/L (ref 15–37)
BACTERIA #/AREA URNS HPF: ABNORMAL /HPF
BASOPHILS # BLD AUTO: 0.02 K/UL (ref 0–0.2)
BASOPHILS NFR BLD AUTO: 0.2 % (ref 0–1)
BILIRUB SERPL-MCNC: 0.5 MG/DL (ref ?–1.2)
BILIRUB UR QL STRIP: NEGATIVE
BUN SERPL-MCNC: 9 MG/DL (ref 7–18)
BUN/CREAT SERPL: 11 (ref 6–20)
CALCIUM SERPL-MCNC: 9.4 MG/DL (ref 8.5–10.1)
CHLORIDE SERPL-SCNC: 103 MMOL/L (ref 98–107)
CLARITY UR: CLEAR
CO2 SERPL-SCNC: 27 MMOL/L (ref 21–32)
COLOR UR: YELLOW
CREAT SERPL-MCNC: 0.82 MG/DL (ref 0.55–1.02)
DIFFERENTIAL METHOD BLD: ABNORMAL
EGFR (NO RACE VARIABLE) (RUSH/TITUS): 78 ML/MIN/1.73M2
EOSINOPHIL # BLD AUTO: 0.13 K/UL (ref 0–0.5)
EOSINOPHIL NFR BLD AUTO: 1.5 % (ref 1–4)
ERYTHROCYTE [DISTWIDTH] IN BLOOD BY AUTOMATED COUNT: 13.8 % (ref 11.5–14.5)
GLOBULIN SER-MCNC: 4.6 G/DL (ref 2–4)
GLUCOSE SERPL-MCNC: 149 MG/DL (ref 74–106)
GLUCOSE UR STRIP-MCNC: NEGATIVE MG/DL
HCT VFR BLD AUTO: 44.5 % (ref 38–47)
HGB BLD-MCNC: 14 G/DL (ref 12–16)
KETONES UR STRIP-SCNC: NEGATIVE MG/DL
LEUKOCYTE ESTERASE UR QL STRIP: NEGATIVE
LIPASE SERPL-CCNC: 26 U/L (ref 16–77)
LYMPHOCYTES # BLD AUTO: 2.9 K/UL (ref 1–4.8)
LYMPHOCYTES NFR BLD AUTO: 33.5 % (ref 27–41)
MCH RBC QN AUTO: 28.1 PG (ref 27–31)
MCHC RBC AUTO-ENTMCNC: 31.5 G/DL (ref 32–36)
MCV RBC AUTO: 89.4 FL (ref 80–96)
MONOCYTES # BLD AUTO: 0.45 K/UL (ref 0–0.8)
MONOCYTES NFR BLD AUTO: 5.2 % (ref 2–6)
MPC BLD CALC-MCNC: 10.1 FL (ref 9.4–12.4)
NEUTROPHILS # BLD AUTO: 5.16 K/UL (ref 1.8–7.7)
NEUTROPHILS NFR BLD AUTO: 59.6 % (ref 53–65)
NITRITE UR QL STRIP: NEGATIVE
PH UR STRIP: 6 PH UNITS
PLATELET # BLD AUTO: 237 K/UL (ref 150–400)
POTASSIUM SERPL-SCNC: 3.5 MMOL/L (ref 3.5–5.1)
PROT SERPL-MCNC: 8.4 G/DL (ref 6.4–8.2)
PROT UR QL STRIP: NEGATIVE
RBC # BLD AUTO: 4.98 M/UL (ref 4.2–5.4)
RBC # UR STRIP: ABNORMAL /UL
RBC #/AREA URNS HPF: ABNORMAL /HPF
SODIUM SERPL-SCNC: 141 MMOL/L (ref 136–145)
SP GR UR STRIP: 1.02
SQUAMOUS #/AREA URNS LPF: ABNORMAL /LPF
UROBILINOGEN UR STRIP-ACNC: 0.2 MG/DL
WBC # BLD AUTO: 8.66 K/UL (ref 4.5–11)
WBC #/AREA URNS HPF: ABNORMAL /HPF
YEAST #/AREA URNS HPF: ABNORMAL /HPF

## 2024-06-06 PROCEDURE — 80053 COMPREHEN METABOLIC PANEL: CPT | Performed by: NURSE PRACTITIONER

## 2024-06-06 PROCEDURE — 36415 COLL VENOUS BLD VENIPUNCTURE: CPT | Performed by: NURSE PRACTITIONER

## 2024-06-06 PROCEDURE — 85025 COMPLETE CBC W/AUTO DIFF WBC: CPT | Performed by: NURSE PRACTITIONER

## 2024-06-06 PROCEDURE — 81001 URINALYSIS AUTO W/SCOPE: CPT | Performed by: NURSE PRACTITIONER

## 2024-06-06 PROCEDURE — 83690 ASSAY OF LIPASE: CPT | Performed by: NURSE PRACTITIONER

## 2024-06-06 PROCEDURE — 99284 EMERGENCY DEPT VISIT MOD MDM: CPT | Mod: 25

## 2024-06-06 PROCEDURE — 99284 EMERGENCY DEPT VISIT MOD MDM: CPT | Mod: GF | Performed by: NURSE PRACTITIONER

## 2024-06-06 RX ORDER — KETOROLAC TROMETHAMINE 30 MG/ML
15 INJECTION, SOLUTION INTRAMUSCULAR; INTRAVENOUS
Status: COMPLETED | OUTPATIENT
Start: 2024-06-07 | End: 2024-06-07

## 2024-06-06 RX ORDER — ONDANSETRON HYDROCHLORIDE 2 MG/ML
4 INJECTION, SOLUTION INTRAVENOUS
Status: DISCONTINUED | OUTPATIENT
Start: 2024-06-07 | End: 2024-06-07

## 2024-06-07 VITALS
BODY MASS INDEX: 39.22 KG/M2 | TEMPERATURE: 98 F | OXYGEN SATURATION: 100 % | DIASTOLIC BLOOD PRESSURE: 64 MMHG | WEIGHT: 243 LBS | RESPIRATION RATE: 15 BRPM | HEART RATE: 57 BPM | SYSTOLIC BLOOD PRESSURE: 142 MMHG

## 2024-06-07 PROCEDURE — 96374 THER/PROPH/DIAG INJ IV PUSH: CPT

## 2024-06-07 PROCEDURE — 63600175 PHARM REV CODE 636 W HCPCS: Performed by: NURSE PRACTITIONER

## 2024-06-07 PROCEDURE — 96375 TX/PRO/DX INJ NEW DRUG ADDON: CPT

## 2024-06-07 RX ORDER — ONDANSETRON HYDROCHLORIDE 2 MG/ML
4 INJECTION, SOLUTION INTRAVENOUS
Status: COMPLETED | OUTPATIENT
Start: 2024-06-07 | End: 2024-06-07

## 2024-06-07 RX ADMIN — KETOROLAC TROMETHAMINE 15 MG: 30 INJECTION, SOLUTION INTRAMUSCULAR at 12:06

## 2024-06-07 RX ADMIN — ONDANSETRON 4 MG: 2 INJECTION INTRAMUSCULAR; INTRAVENOUS at 12:06

## 2024-06-07 NOTE — ED PROVIDER NOTES
Encounter Date: 6/6/2024       History     Chief Complaint   Patient presents with    Abdominal Pain     Adominal pain x 1 week.  Has had xrays this past week to see what's going on.  C/o being weak and chills since 3 pm.     70 y/o AAF arrived to the ED via POV with complaint of lower generalized abdominal pain, worse on left lower quad. Pain started 1 week ago. Describes pain as sharp, intermittent and rates pain 9/10. Has not taken anything for pain. Having nausea, but vomiting or diarrhea. Took LOC today to have bm. Patient has been seeing ESTRADA Mccord NP for these symptoms. Had CT abdomen/pelvis with contrast on 6/5 that revealed 5 mm nonobstructing stone left kidney and an indeterminate lesion within the left adrenal gland measures 3.9 cm.  CT or MRI with adrenal mass protocol recommended by radiologist. Patient states that she was told to be here at [facility] in the morning at 9 am, but unsure why. I checked with radiology, but patient is not scheduled for any test. Will have patient to call her PCP tomorrow morning to check about that appointment time.       The history is provided by the patient.     Review of patient's allergies indicates:  No Known Allergies  Past Medical History:   Diagnosis Date    Diabetes mellitus, type 2     HTN (hypertension)      Past Surgical History:   Procedure Laterality Date    HYSTERECTOMY      OOPHORECTOMY       Family History   Problem Relation Name Age of Onset    Kidney cancer Mother      Diabetes Sister      Heart disease Sister       Social History     Tobacco Use    Smoking status: Never    Smokeless tobacco: Never   Substance Use Topics    Alcohol use: Never    Drug use: Never     Review of Systems   Constitutional:  Positive for chills and diaphoresis. Negative for activity change, appetite change, fatigue and fever.   HENT: Negative.     Eyes: Negative.    Respiratory: Negative.     Gastrointestinal:  Positive for abdominal pain, constipation and nausea. Negative for  diarrhea and vomiting.   Genitourinary:  Negative for dysuria, frequency, hematuria, pelvic pain, vaginal bleeding and vaginal discharge.   Musculoskeletal: Negative.    Skin: Negative.    Neurological:  Positive for weakness (generalized weakness).   Hematological: Negative.    Psychiatric/Behavioral: Negative.         Physical Exam     Initial Vitals [06/06/24 2234]   BP Pulse Resp Temp SpO2   (!) 171/87 60 18 98.1 °F (36.7 °C) 98 %      MAP       --         Physical Exam    Nursing note and vitals reviewed.  Constitutional: She appears well-developed and well-nourished. She is Obese . She is cooperative.  Non-toxic appearance. She does not have a sickly appearance. She does not appear ill. No distress.   HENT:   Head: Normocephalic and atraumatic.   Right Ear: External ear normal.   Mouth/Throat: Mucous membranes are normal.   Eyes: Conjunctivae are normal. Pupils are equal, round, and reactive to light.   Cardiovascular:  Normal rate, regular rhythm, normal heart sounds and intact distal pulses.           No edema to BLE   Pulmonary/Chest: Effort normal and breath sounds normal.   Abdominal: Abdomen is soft. Bowel sounds are normal. There is generalized abdominal tenderness.   No flank pain.   No right CVA tenderness.  No left CVA tenderness. There is no rebound and no guarding.   Musculoskeletal:         General: Normal range of motion.      Thoracic back: Normal.      Lumbar back: Normal.     Neurological: She is alert and oriented to person, place, and time.   Skin: Skin is warm and dry. Capillary refill takes less than 2 seconds.   Psychiatric: She has a normal mood and affect. Her behavior is normal. Judgment and thought content normal.         Medical Screening Exam   See Full Note    ED Course   Procedures  Labs Reviewed   COMPREHENSIVE METABOLIC PANEL - Abnormal; Notable for the following components:       Result Value    Glucose 149 (*)     Total Protein 8.4 (*)     Globulin 4.6 (*)     AST 13 (*)      All other components within normal limits   URINALYSIS, REFLEX TO URINE CULTURE - Abnormal; Notable for the following components:    Blood, UA Trace-Intact (*)     All other components within normal limits   CBC WITH DIFFERENTIAL - Abnormal; Notable for the following components:    MCHC 31.5 (*)     All other components within normal limits   URINALYSIS, MICROSCOPIC - Abnormal; Notable for the following components:    Bacteria, UA Few (*)     Yeast, UA Few (*)     Squamous Epithelial Cells, UA Few (*)     All other components within normal limits   LIPASE - Normal   CBC W/ AUTO DIFFERENTIAL    Narrative:     The following orders were created for panel order CBC auto differential.  Procedure                               Abnormality         Status                     ---------                               -----------         ------                     CBC with Differential[[id number]]       Abnormal            Final result                 Please view results for these tests on the individual orders.          Imaging Results              X-Ray KUB (In process)                      Medications   ketorolac injection 15 mg (has no administration in time range)   ondansetron injection 4 mg (has no administration in time range)     Medical Decision Making  70 y/o AAF arrived to the ED via POV with complaint of lower generalized abdominal pain, worse on left lower quad. Pain started 1 week ago. Describes pain as sharp, intermittent and rates pain 9/10. Has not taken anything for pain. Having nausea, but vomiting or diarrhea. Took LOC today to have bm. Patient has been seeing ESTRADA Mccord NP for these symptoms. Had CT abdomen/pelvis with contrast on 6/5 that revealed 5 mm nonobstructing stone left kidney and an indeterminate lesion within the left adrenal gland measures 3.9 cm.  CT or MRI with adrenal mass protocol recommended by radiologist. Patient states that she was told to be here at [facility] in the morning at 9 am, but  unsure why. I checked with radiology, but patient is not scheduled for any test. Will have patient to call her PCP tomorrow morning to check about that appointment time.       Amount and/or Complexity of Data Reviewed  Labs: ordered.     Details: Labs Reviewed  COMPREHENSIVE METABOLIC PANEL - Abnormal; Notable for the following components:     Glucose                       149 (*)                Total Protein                 8.4 (*)                Globulin                      4.6 (*)                AST                           13 (*)              All other components within normal limits  URINALYSIS, REFLEX TO URINE CULTURE - Abnormal; Notable for the following components:     Blood, UA                       (*)               All other components within normal limits  CBC WITH DIFFERENTIAL - Abnormal; Notable for the following components:     MCHC                          31.5 (*)            All other components within normal limits  URINALYSIS, MICROSCOPIC - Abnormal; Notable for the following components:     Bacteria, UA                  Few (*)                Yeast, UA                     Few (*)                Squamous Epithelial Cells, UA   Few (*)             All other components within normal limits  LIPASE - Normal  CBC W/ AUTO DIFFERENTIAL   Radiology: ordered.     Details: KUB: no acute process noted formal report pending  Discussion of management or test interpretation with external provider(s): Discharge MDM  I discussed lab and CT scan results from 06/05 with patient. I believe that patient's symptoms are related to taking LOC today for constipation.   Patient was managed in the ED with:  -Toradol 30 mg IV x 1  -Zofran 4 mg IV x 1  The response to treatment was tolerated well. She will follow up with her PCP tomorrow. Reviewed discharge instructions with patient. She agreed to treatment plan and verbalized understanding.   Patient was discharged in stable condition.  Detailed return precautions  discussed.     Risk  Prescription drug management.                                      Clinical Impression:   Final diagnoses:  [R10.9] Abdominal pain, unspecified abdominal location (Primary)  [R11.0] Nausea        ED Disposition Condition    Discharge Stable          ED Prescriptions    None       Follow-up Information       Follow up With Specialties Details Why Contact Info    Gabi Mccord, NP Family Medicine Call in 1 day schedule follow up with PCP [address]  [address]  [place] [address]  [phone]                 Dianna Fairchild, [facility]  06/07/24 0020

## 2024-06-09 ENCOUNTER — TELEPHONE (OUTPATIENT)
Dept: EMERGENCY MEDICINE | Facility: HOSPITAL | Age: 69
End: 2024-06-09
Payer: MEDICARE

## 2024-06-09 ENCOUNTER — HOSPITAL ENCOUNTER (EMERGENCY)
Facility: HOSPITAL | Age: 69
Discharge: HOME | End: 2024-06-09
Attending: FAMILY MEDICINE
Payer: MEDICARE

## 2024-06-09 VITALS
RESPIRATION RATE: 18 BRPM | OXYGEN SATURATION: 99 % | WEIGHT: 243 LBS | HEIGHT: 66 IN | TEMPERATURE: 98 F | HEART RATE: 59 BPM | BODY MASS INDEX: 39.05 KG/M2 | DIASTOLIC BLOOD PRESSURE: 107 MMHG | SYSTOLIC BLOOD PRESSURE: 184 MMHG

## 2024-06-09 DIAGNOSIS — Z01.812 PRE-PROCEDURE LAB EXAM: ICD-10-CM

## 2024-06-09 DIAGNOSIS — N20.0 RENAL STONE: Primary | ICD-10-CM

## 2024-06-09 LAB
ALBUMIN SERPL BCP-MCNC: 3.6 G/DL (ref 3.5–5)
ALBUMIN/GLOB SERPL: 0.8 {RATIO}
ALP SERPL-CCNC: 81 U/L (ref 55–142)
ALT SERPL W P-5'-P-CCNC: 20 U/L (ref 13–56)
ANION GAP SERPL CALCULATED.3IONS-SCNC: 10 MMOL/L (ref 7–16)
AST SERPL W P-5'-P-CCNC: 17 U/L (ref 15–37)
BASOPHILS # BLD AUTO: 0.05 K/UL (ref 0–0.2)
BASOPHILS NFR BLD AUTO: 0.5 % (ref 0–1)
BILIRUB SERPL-MCNC: 0.4 MG/DL (ref ?–1.2)
BILIRUB UR QL STRIP: NEGATIVE
BUN SERPL-MCNC: 12 MG/DL (ref 7–18)
BUN/CREAT SERPL: 15 (ref 6–20)
CALCIUM SERPL-MCNC: 9.4 MG/DL (ref 8.5–10.1)
CHLORIDE SERPL-SCNC: 105 MMOL/L (ref 98–107)
CLARITY UR: CLEAR
CO2 SERPL-SCNC: 27 MMOL/L (ref 21–32)
COLOR UR: COLORLESS
CREAT SERPL-MCNC: 0.79 MG/DL (ref 0.55–1.02)
CREAT SERPL-MCNC: 0.79 MG/DL (ref 0.55–1.02)
DIFFERENTIAL METHOD BLD: ABNORMAL
EGFR (NO RACE VARIABLE) (RUSH/TITUS): 81 ML/MIN/1.73M2
EGFR (NO RACE VARIABLE) (RUSH/TITUS): 81 ML/MIN/1.73M2
EOSINOPHIL # BLD AUTO: 0.09 K/UL (ref 0–0.5)
EOSINOPHIL NFR BLD AUTO: 0.9 % (ref 1–4)
ERYTHROCYTE [DISTWIDTH] IN BLOOD BY AUTOMATED COUNT: 13.1 % (ref 11.5–14.5)
GLOBULIN SER-MCNC: 4.5 G/DL (ref 2–4)
GLUCOSE SERPL-MCNC: 141 MG/DL (ref 74–106)
GLUCOSE UR STRIP-MCNC: NORMAL MG/DL
HCT VFR BLD AUTO: 42.9 % (ref 38–47)
HGB BLD-MCNC: 13.7 G/DL (ref 12–16)
IMM GRANULOCYTES # BLD AUTO: 0.06 K/UL (ref 0–0.04)
IMM GRANULOCYTES NFR BLD: 0.6 % (ref 0–0.4)
KETONES UR STRIP-SCNC: NEGATIVE MG/DL
LEUKOCYTE ESTERASE UR QL STRIP: NEGATIVE
LIPASE SERPL-CCNC: 28 U/L (ref 16–77)
LYMPHOCYTES # BLD AUTO: 3.03 K/UL (ref 1–4.8)
LYMPHOCYTES NFR BLD AUTO: 30.4 % (ref 27–41)
MCH RBC QN AUTO: 28.2 PG (ref 27–31)
MCHC RBC AUTO-ENTMCNC: 31.9 G/DL (ref 32–36)
MCV RBC AUTO: 88.5 FL (ref 80–96)
MONOCYTES # BLD AUTO: 0.55 K/UL (ref 0–0.8)
MONOCYTES NFR BLD AUTO: 5.5 % (ref 2–6)
MPC BLD CALC-MCNC: 10.9 FL (ref 9.4–12.4)
NEUTROPHILS # BLD AUTO: 6.19 K/UL (ref 1.8–7.7)
NEUTROPHILS NFR BLD AUTO: 62.1 % (ref 53–65)
NITRITE UR QL STRIP: NEGATIVE
NRBC # BLD AUTO: 0 X10E3/UL
NRBC, AUTO (.00): 0 %
PH UR STRIP: 6.5 PH UNITS
PLATELET # BLD AUTO: 262 K/UL (ref 150–400)
POTASSIUM SERPL-SCNC: 3 MMOL/L (ref 3.5–5.1)
PROT SERPL-MCNC: 8.1 G/DL (ref 6.4–8.2)
PROT UR QL STRIP: NEGATIVE
RBC # BLD AUTO: 4.85 M/UL (ref 4.2–5.4)
RBC # UR STRIP: NEGATIVE /UL
SODIUM SERPL-SCNC: 139 MMOL/L (ref 136–145)
SP GR UR STRIP: 1.01
UROBILINOGEN UR STRIP-ACNC: NORMAL MG/DL
WBC # BLD AUTO: 9.97 K/UL (ref 4.5–11)

## 2024-06-09 PROCEDURE — 96372 THER/PROPH/DIAG INJ SC/IM: CPT | Performed by: FAMILY MEDICINE

## 2024-06-09 PROCEDURE — 80053 COMPREHEN METABOLIC PANEL: CPT | Performed by: FAMILY MEDICINE

## 2024-06-09 PROCEDURE — 99285 EMERGENCY DEPT VISIT HI MDM: CPT | Mod: 25

## 2024-06-09 PROCEDURE — 81003 URINALYSIS AUTO W/O SCOPE: CPT | Performed by: FAMILY MEDICINE

## 2024-06-09 PROCEDURE — 85025 COMPLETE CBC W/AUTO DIFF WBC: CPT | Performed by: FAMILY MEDICINE

## 2024-06-09 PROCEDURE — 82565 ASSAY OF CREATININE: CPT | Mod: 91 | Performed by: RADIOLOGY

## 2024-06-09 PROCEDURE — 63600175 PHARM REV CODE 636 W HCPCS: Performed by: FAMILY MEDICINE

## 2024-06-09 PROCEDURE — 36415 COLL VENOUS BLD VENIPUNCTURE: CPT | Performed by: FAMILY MEDICINE

## 2024-06-09 PROCEDURE — 83690 ASSAY OF LIPASE: CPT | Performed by: FAMILY MEDICINE

## 2024-06-09 RX ORDER — MORPHINE SULFATE 4 MG/ML
4 INJECTION, SOLUTION INTRAMUSCULAR; INTRAVENOUS
Status: DISCONTINUED | OUTPATIENT
Start: 2024-06-09 | End: 2024-06-09

## 2024-06-09 RX ORDER — MORPHINE SULFATE 4 MG/ML
4 INJECTION, SOLUTION INTRAMUSCULAR; INTRAVENOUS
Status: COMPLETED | OUTPATIENT
Start: 2024-06-09 | End: 2024-06-09

## 2024-06-09 RX ORDER — SULFAMETHOXAZOLE AND TRIMETHOPRIM 800; 160 MG/1; MG/1
1 TABLET ORAL 2 TIMES DAILY
Qty: 14 TABLET | Refills: 0 | Status: SHIPPED | OUTPATIENT
Start: 2024-06-09 | End: 2024-06-16

## 2024-06-09 RX ORDER — ONDANSETRON HYDROCHLORIDE 2 MG/ML
4 INJECTION, SOLUTION INTRAVENOUS
Status: COMPLETED | OUTPATIENT
Start: 2024-06-09 | End: 2024-06-09

## 2024-06-09 RX ORDER — HYDROCODONE BITARTRATE AND ACETAMINOPHEN 5; 325 MG/1; MG/1
1 TABLET ORAL EVERY 6 HOURS PRN
Qty: 12 TABLET | Refills: 0 | Status: SHIPPED | OUTPATIENT
Start: 2024-06-09

## 2024-06-09 RX ORDER — ROSUVASTATIN CALCIUM 20 MG/1
20 TABLET, COATED ORAL DAILY
COMMUNITY
Start: 2024-02-14

## 2024-06-09 RX ORDER — ONDANSETRON HYDROCHLORIDE 2 MG/ML
4 INJECTION, SOLUTION INTRAVENOUS ONCE
Status: DISCONTINUED | OUTPATIENT
Start: 2024-06-09 | End: 2024-06-09

## 2024-06-09 RX ADMIN — ONDANSETRON 4 MG: 2 INJECTION INTRAMUSCULAR; INTRAVENOUS at 09:06

## 2024-06-09 RX ADMIN — MORPHINE SULFATE 4 MG: 4 INJECTION INTRAVENOUS at 09:06

## 2024-06-09 NOTE — Clinical Note
"Arianna"Kari Galicia was seen and treated in our emergency department on 6/9/2024.  She may return to work on 06/12/2024.       If you have any questions or concerns, please don't hesitate to call.      Walt Mathur, DO"

## 2024-06-09 NOTE — Clinical Note
"Arianna"aKri Galicia was seen and treated in our emergency department on 6/9/2024.  She may return to work on 06/12/2024.       If you have any questions or concerns, please don't hesitate to call.      Walt Mathur, DO"

## 2024-06-09 NOTE — ED TRIAGE NOTES
Patient arrives to ED with complaints of generalized abdominal pain, nausea, and right sided flank pain x 1 week.

## 2024-06-10 NOTE — ED PROVIDER NOTES
Lorena Date: 6/9/2024    SCRIBE #1 NOTE: I, Hilaria Salazar, am scribing for, and in the presence of,  Usha Mathur DO. I have scribed the entire note.       History     Chief Complaint   Patient presents with    Abdominal Pain    Nausea    Flank Pain     This is a 68 y/o female,who presents to the ED with complaints of abdominal pain which has been on and off. She notes the pain is more to the epigastric area. She notes she still has her gallbladder. She notes nausea but denies any vomiting, diarrhea or constipation. She reports the pain radiates into her back. There are no other complaints/pain in the ED at this time. She has a known hx of HTN and diabetes.     The history is provided by the patient. No  was used.     Review of patient's allergies indicates:  No Known Allergies  Past Medical History:   Diagnosis Date    Diabetes mellitus, type 2     HTN (hypertension)      Past Surgical History:   Procedure Laterality Date    HYSTERECTOMY      OOPHORECTOMY       Family History   Problem Relation Name Age of Onset    Kidney cancer Mother      Diabetes Sister      Heart disease Sister       Social History     Tobacco Use    Smoking status: Never    Smokeless tobacco: Never   Substance Use Topics    Alcohol use: Never    Drug use: Never     Review of Systems   Gastrointestinal:  Positive for abdominal pain and nausea. Negative for constipation, diarrhea and vomiting.   All other systems reviewed and are negative.      Physical Exam     Initial Vitals [06/09/24 1753]   BP Pulse Resp Temp SpO2   (!) 161/78 69 20 97.8 °F (36.6 °C) 96 %      MAP       --         Physical Exam    Nursing note and vitals reviewed.  Constitutional: She appears well-developed and well-nourished.   HENT:   Head: Normocephalic and atraumatic.   Eyes: Conjunctivae and EOM are normal. Pupils are equal, round, and reactive to light.   Neck: Neck supple.   Normal range of motion.  Cardiovascular:  Normal  rate, regular rhythm, normal heart sounds and intact distal pulses.           Pulmonary/Chest: Breath sounds normal.   Abdominal: Abdomen is soft. Bowel sounds are normal. There is abdominal tenderness (Epigastric as well as RUQ tenderness.).   Musculoskeletal:         General: Normal range of motion.      Cervical back: Normal range of motion and neck supple.     Neurological: She is alert and oriented to person, place, and time. She has normal strength.   Skin: Skin is warm and dry. Capillary refill takes less than 2 seconds.   Psychiatric: She has a normal mood and affect. Thought content normal.         ED Course   Procedures  Labs Reviewed   COMPREHENSIVE METABOLIC PANEL - Abnormal; Notable for the following components:       Result Value    Potassium 3.0 (*)     Glucose 141 (*)     Globulin 4.5 (*)     All other components within normal limits   CBC WITH DIFFERENTIAL - Abnormal; Notable for the following components:    MCHC 31.9 (*)     Eosinophils % 0.9 (*)     Immature Granulocytes % 0.6 (*)     Immature Granulocytes, Absolute 0.06 (*)     All other components within normal limits   LIPASE - Normal   CREATININE, SERUM - Normal   CBC W/ AUTO DIFFERENTIAL    Narrative:     The following orders were created for panel order CBC auto differential.  Procedure                               Abnormality         Status                     ---------                               -----------         ------                     CBC with Differential[[id number]]       Abnormal            Final result                 Please view results for these tests on the individual orders.   URINALYSIS, REFLEX TO URINE CULTURE          Imaging Results              CT Renal Stone Study ABD Pelvis WO (Final result)  Result time 06/09/24 18:53:00      Final result by Ismael Tracy II, MD (06/09/24 18:53:00)                   Impression:      Nonobstructing left renal calculi.  No other evidence of abnormality  demonstrated      Electronically signed by: Ismael Tracy  Date:    06/09/2024  Time:    18:53               Narrative:    EXAMINATION:  CT RENAL STONE STUDY ABD PELVIS WO    CLINICAL HISTORY:  Flank pain, kidney stone suspected;    TECHNIQUE:  Axial CT imaging of the abdomen and pelvis is performed without contrast.    CT dose reduction technique used - Dose Rite and tube current modulation.    COMPARISON:  None available    FINDINGS:  Cardiac and lung bases are within normal limits    CT abdomen: The liver, spleen, pancreas and adrenal glands are normal in size and density.  No evidence of focal lesion is demonstrated in these solid organs.    Kidneys are normal in size and density.  Nonobstructing calculi present left kidney measures up to 6.5 mm no evidence of hydronephrosis or ureterolithiasis is seen.    The bowel caliber is normal and no wall thickening or adjacent inflammatory change is seen.  No evidence of free fluid or free air is present.  Appendix is normal.    CT pelvis: The bowel and bladder appear within normal limits.  The pelvic organs show no evidence of abnormality                                       Medications   morphine injection 4 mg (4 mg Intramuscular Given 6/9/24 2111)   ondansetron injection 4 mg (4 mg Intramuscular Given 6/9/24 2111)     Medical Decision Making  Amount and/or Complexity of Data Reviewed  Labs: ordered.  Radiology: ordered.    Risk  Prescription drug management.              Attending Attestation:           Physician Attestation for Scribe:  Physician Attestation Statement for Scribe #1: I, Walt Mathur, DO, reviewed documentation, as scribed by Hilaria Salazar in my presence, and it is both accurate and complete.             ED Course as of 06/10/24 0446   Sun Jun 09, 2024   1915 CT Renal stone study ABD Pelvis WO:   Nonobstructing left renal calculi.  No other evidence of abnormality demonstrated [BW]      ED Course User Index  [BW] Hilaria Salazar                Medical Decision Making:   Initial Assessment:   This is a 68 y/o female,who presents to the ED with complaints of abdominal pain which has been on and off. She notes the pain is more to the epigastric area. She notes she still has her gallbladder. She notes nausea but denies any vomiting, diarrhea or constipation. She reports the pain radiates into her back. There are no other complaints/pain in the ED at this time. She has a known hx of HTN and diabetes.     The history is provided by the patient. No  was used.     Differential Diagnosis:   Renal stone  ED Management:  Follow up with the nephrologist             Clinical Impression:  Final diagnoses:  [N20.0] Renal stone (Primary)          ED Disposition Condition    Discharge Stable          ED Prescriptions       Medication Sig Dispense Start Date End Date Auth. Provider    HYDROcodone-acetaminophen (NORCO) 5-325 mg per tablet Take 1 tablet by mouth every 6 (six) hours as needed for Pain. 12 tablet 6/9/2024 -- Walt Mathur,     sulfamethoxazole-trimethoprim 800-160mg (BACTRIM DS) 800-160 mg Tab Take 1 tablet by mouth 2 (two) times daily. for 7 days 14 tablet 6/9/2024 6/16/2024 Walt Mathur DO          Follow-up Information    None          Walt Mathur,   06/10/24 5904

## 2024-06-19 NOTE — PROGRESS NOTES
Subjective     Patient ID: Arianna Galicia is a 69 y.o. female.    Chief Complaint: No chief complaint on file.    This pleasant 69 year old female presents to the clinic as a new patient referral from Dr. ESTRADA Mathur for kidney stone.  Patient went to the ER on June 9, 2024 with right side abdominal pain.  Her CT renal stone study done on June 9, 2024 showed the Kidneys are normal in size and density.  There is a Nonobstructing calculi present left kidney measures up to 6.5 mm no evidence of hydronephrosis or ureterolithiasis is seen.  I reviewed the CT with Dr. Escobar and discussed with the patient the results.  I discussed that the CT imaging did not support stone pain or a Urological source for her pain.  She does report nocturia 0 to 1 time a night and stress incontinence.  She denies dysuria, hematuria, frequency, urgency, or incomplete bladder emptying.  She denies fever, chills, nausea or vomiting.  She denies any  pains.   We discussed doing the Kegel exercises and she will follow up with Urology as needed for the stones.  I discussed the plan in detail with the patient and she is in agreement with the plan.  All her questions were answered at today's visit.  I spent 30 minutes counseling this patient.      CT RENAL STONE STUDY ABD PELVIS WO done on June 9, 2024. CLINICAL HISTORY Flank pain, kidney stone suspected;  TECHNIQUE: Axial CT imaging of the abdomen and pelvis is performed without contrast.  CT dose reduction technique used - Dose Rite and tube current modulation.  COMPARISON:  None available.  FINDINGS:  Cardiac and lung bases are within normal limits  CT abdomen: The liver, spleen, pancreas and adrenal glands are normal in size and density.  No evidence of focal lesion is demonstrated in these solid organs.  Kidneys are normal in size and density.  Nonobstructing calculi present left kidney measures up to 6.5 mm no evidence of hydronephrosis or ureterolithiasis is seen.  The bowel caliber is  normal and no wall thickening or adjacent inflammatory change is seen.  No evidence of free fluid or free air is present.  Appendix is normal.  CT pelvis: The bowel and bladder appear within normal limits.  The pelvic organs show no evidence of abnormality  Impression:  Nonobstructing left renal calculi.  No other evidence of abnormality demonstrated.  Electronically signed by:Ismael Tracy.   ---------------------------------------------------------------------------------------------------------------------------------------------------------------------------------------------------  [June 20, 2024].           Review of Systems   Constitutional:  Negative for activity change and fever.   HENT:  Negative for hearing loss and trouble swallowing.    Eyes:  Negative for visual disturbance.   Respiratory:  Negative for cough, shortness of breath and wheezing.    Cardiovascular:  Negative for chest pain.   Gastrointestinal:  Negative for abdominal pain, diarrhea, nausea and vomiting.   Endocrine: Negative for polyuria.   Genitourinary:  Positive for bladder incontinence and nocturia. Negative for decreased urine volume, difficulty urinating, dysuria, enuresis, flank pain, frequency, hematuria and urgency.        Kidney stone    Musculoskeletal:  Negative for back pain and gait problem.   Integumentary:  Negative for rash.   Neurological:  Negative for speech difficulty and weakness.   Psychiatric/Behavioral:  Negative for behavioral problems and confusion.           Objective     Physical Exam  Vitals and nursing note reviewed.   Constitutional:       General: She is not in acute distress.     Appearance: Normal appearance. She is not ill-appearing, toxic-appearing or diaphoretic.   HENT:      Head: Normocephalic.   Eyes:      Extraocular Movements: Extraocular movements intact.   Cardiovascular:      Rate and Rhythm: Normal rate and regular rhythm.      Heart sounds: Normal heart sounds.   Pulmonary:      Effort:  Pulmonary effort is normal.      Breath sounds: Normal breath sounds. No wheezing, rhonchi or rales.   Abdominal:      General: Bowel sounds are normal.      Palpations: Abdomen is soft.      Tenderness: There is no abdominal tenderness. There is no right CVA tenderness, left CVA tenderness, guarding or rebound.   Musculoskeletal:         General: Normal range of motion.      Cervical back: Normal range of motion. No rigidity.   Skin:     General: Skin is warm and dry.   Neurological:      General: No focal deficit present.      Mental Status: She is alert and oriented to person, place, and time.      Motor: No weakness.      Coordination: Coordination normal.      Gait: Gait normal.   Psychiatric:         Mood and Affect: Mood normal.         Behavior: Behavior normal.         Thought Content: Thought content normal.        Assessment and Plan     Problem List Items Addressed This Visit          Renal/    Renal stone - Primary    Nocturia    Stress incontinence        Reviewed CT results with patient -- stone not the source of her pain   Follow up with Urology as needed

## 2024-06-20 ENCOUNTER — OFFICE VISIT (OUTPATIENT)
Dept: UROLOGY | Facility: CLINIC | Age: 69
End: 2024-06-20
Payer: MEDICARE

## 2024-06-20 VITALS
HEART RATE: 78 BPM | SYSTOLIC BLOOD PRESSURE: 141 MMHG | DIASTOLIC BLOOD PRESSURE: 69 MMHG | OXYGEN SATURATION: 95 % | HEIGHT: 66 IN | WEIGHT: 242 LBS | BODY MASS INDEX: 38.89 KG/M2

## 2024-06-20 DIAGNOSIS — N20.0 RENAL STONE: Primary | ICD-10-CM

## 2024-06-20 DIAGNOSIS — R35.1 NOCTURIA: ICD-10-CM

## 2024-06-20 DIAGNOSIS — N39.3 STRESS INCONTINENCE: ICD-10-CM

## 2024-06-20 PROBLEM — E11.8 MULTIPLE COMPLICATIONS OF TYPE 2 DIABETES MELLITUS: Status: ACTIVE | Noted: 2021-08-18

## 2024-06-20 PROBLEM — R53.83 FATIGUE: Status: ACTIVE | Noted: 2024-05-31

## 2024-06-20 PROBLEM — M54.6 THORACIC BACK PAIN: Status: ACTIVE | Noted: 2023-11-13

## 2024-06-20 PROBLEM — M54.9 COSTOVERTEBRAL ANGLE TENDERNESS: Status: ACTIVE | Noted: 2023-11-13

## 2024-06-20 PROBLEM — M54.50 ACUTE LOW BACK PAIN: Status: ACTIVE | Noted: 2020-11-16

## 2024-06-20 PROBLEM — R10.9 ACUTE ABDOMINAL PAIN: Status: ACTIVE | Noted: 2024-05-31

## 2024-06-20 PROBLEM — M25.551 PAIN OF RIGHT HIP JOINT: Status: ACTIVE | Noted: 2024-06-04

## 2024-06-20 PROBLEM — E27.8 ADRENAL MASS: Status: ACTIVE | Noted: 2024-06-10

## 2024-06-20 PROBLEM — E78.5 HYPERLIPIDEMIA: Status: ACTIVE | Noted: 2021-09-14

## 2024-06-20 PROBLEM — R73.09 BLOOD GLUCOSE ABNORMAL: Status: ACTIVE | Noted: 2019-04-29

## 2024-06-20 PROBLEM — R11.0 NAUSEA: Status: ACTIVE | Noted: 2024-05-31

## 2024-06-20 PROBLEM — J18.9 PNEUMONIA: Status: ACTIVE | Noted: 2024-02-11

## 2024-06-20 PROBLEM — E11.9 DIABETES MELLITUS: Status: ACTIVE | Noted: 2023-11-13

## 2024-06-20 PROCEDURE — 99215 OFFICE O/P EST HI 40 MIN: CPT | Mod: PBBFAC | Performed by: NURSE PRACTITIONER

## 2024-06-20 PROCEDURE — 99999 PR PBB SHADOW E&M-EST. PATIENT-LVL V: CPT | Mod: PBBFAC,,, | Performed by: NURSE PRACTITIONER

## 2024-06-20 RX ORDER — ONDANSETRON 8 MG/1
TABLET, ORALLY DISINTEGRATING ORAL
COMMUNITY
Start: 2024-05-31

## 2024-06-20 RX ORDER — SULFAMETHOXAZOLE AND TRIMETHOPRIM 800; 160 MG/1; MG/1
1 TABLET ORAL 2 TIMES DAILY
COMMUNITY

## 2024-06-20 RX ORDER — OFLOXACIN 3 MG/ML
SOLUTION/ DROPS OPHTHALMIC
COMMUNITY
Start: 2024-04-25

## 2024-06-20 NOTE — PATIENT INSTRUCTIONS
Reviewed CT results with patient -- stone not the source of her pain   Follow up with Urology as needed

## 2024-07-17 ENCOUNTER — HOSPITAL ENCOUNTER (OUTPATIENT)
Dept: RADIOLOGY | Facility: HOSPITAL | Age: 69
Discharge: HOME | End: 2024-07-17
Attending: NURSE PRACTITIONER
Payer: MEDICARE

## 2024-07-17 DIAGNOSIS — E27.8 ADRENAL MASS: ICD-10-CM

## 2024-07-17 PROCEDURE — 25500020 PHARM REV CODE 255

## 2024-07-17 PROCEDURE — 74170 CT ABD WO CNTRST FLWD CNTRST: CPT | Mod: TC

## 2024-07-17 RX ADMIN — IOPAMIDOL 100 ML: 755 INJECTION, SOLUTION INTRAVENOUS at 08:07

## 2024-07-23 ENCOUNTER — APPOINTMENT (OUTPATIENT)
Dept: LAB | Facility: HOSPITAL | Age: 69
End: 2024-07-23
Attending: NURSE PRACTITIONER
Payer: MEDICARE

## 2024-09-23 PROBLEM — J18.9 PNEUMONIA: Status: RESOLVED | Noted: 2024-02-11 | Resolved: 2024-09-23

## 2025-02-05 ENCOUNTER — HOSPITAL ENCOUNTER (OUTPATIENT)
Dept: RADIOLOGY | Facility: HOSPITAL | Age: 70
Discharge: HOME | End: 2025-02-05
Attending: NURSE PRACTITIONER
Payer: MEDICARE

## 2025-02-05 VITALS — BODY MASS INDEX: 36.96 KG/M2 | HEIGHT: 66 IN | WEIGHT: 230 LBS

## 2025-02-05 DIAGNOSIS — Z12.31 VISIT FOR SCREENING MAMMOGRAM: ICD-10-CM

## 2025-02-05 PROCEDURE — 77067 SCR MAMMO BI INCL CAD: CPT | Mod: TC

## 2025-04-15 ENCOUNTER — HOSPITAL ENCOUNTER (OUTPATIENT)
Dept: RADIOLOGY | Facility: HOSPITAL | Age: 70
Discharge: HOME | End: 2025-04-15
Attending: NURSE PRACTITIONER
Payer: MEDICARE

## 2025-04-15 DIAGNOSIS — R11.0 NAUSEA: ICD-10-CM

## 2025-04-15 PROCEDURE — 76700 US EXAM ABDOM COMPLETE: CPT | Mod: TC

## 2025-04-15 PROCEDURE — 76700 US EXAM ABDOM COMPLETE: CPT | Mod: 26,,, | Performed by: RADIOLOGY

## 2025-04-24 ENCOUNTER — OFFICE VISIT (OUTPATIENT)
Dept: ORTHOPEDICS | Facility: CLINIC | Age: 70
End: 2025-04-24
Payer: MEDICARE

## 2025-04-24 ENCOUNTER — HOSPITAL ENCOUNTER (OUTPATIENT)
Dept: RADIOLOGY | Facility: HOSPITAL | Age: 70
Discharge: HOME | End: 2025-04-24
Attending: NURSE PRACTITIONER
Payer: MEDICARE

## 2025-04-24 VITALS
DIASTOLIC BLOOD PRESSURE: 81 MMHG | HEIGHT: 66 IN | OXYGEN SATURATION: 95 % | SYSTOLIC BLOOD PRESSURE: 153 MMHG | RESPIRATION RATE: 20 BRPM | BODY MASS INDEX: 37.12 KG/M2 | HEART RATE: 65 BPM

## 2025-04-24 DIAGNOSIS — M25.551 RIGHT HIP PAIN: ICD-10-CM

## 2025-04-24 DIAGNOSIS — M16.0 PRIMARY OSTEOARTHRITIS OF BOTH HIPS: ICD-10-CM

## 2025-04-24 DIAGNOSIS — M25.551 RIGHT HIP PAIN: Primary | ICD-10-CM

## 2025-04-24 PROCEDURE — 99999 PR PBB SHADOW E&M-EST. PATIENT-LVL IV: CPT | Mod: PBBFAC,,, | Performed by: NURSE PRACTITIONER

## 2025-04-24 PROCEDURE — 99214 OFFICE O/P EST MOD 30 MIN: CPT | Mod: PBBFAC,25 | Performed by: NURSE PRACTITIONER

## 2025-04-24 PROCEDURE — 1159F MED LIST DOCD IN RCRD: CPT | Mod: CPTII,,, | Performed by: NURSE PRACTITIONER

## 2025-04-24 PROCEDURE — 3288F FALL RISK ASSESSMENT DOCD: CPT | Mod: CPTII,,, | Performed by: NURSE PRACTITIONER

## 2025-04-24 PROCEDURE — 4010F ACE/ARB THERAPY RXD/TAKEN: CPT | Mod: CPTII,,, | Performed by: NURSE PRACTITIONER

## 2025-04-24 PROCEDURE — 3008F BODY MASS INDEX DOCD: CPT | Mod: CPTII,,, | Performed by: NURSE PRACTITIONER

## 2025-04-24 PROCEDURE — 3079F DIAST BP 80-89 MM HG: CPT | Mod: CPTII,,, | Performed by: NURSE PRACTITIONER

## 2025-04-24 PROCEDURE — 1101F PT FALLS ASSESS-DOCD LE1/YR: CPT | Mod: CPTII,,, | Performed by: NURSE PRACTITIONER

## 2025-04-24 PROCEDURE — 99213 OFFICE O/P EST LOW 20 MIN: CPT | Mod: S$PBB,,, | Performed by: NURSE PRACTITIONER

## 2025-04-24 PROCEDURE — 3077F SYST BP >= 140 MM HG: CPT | Mod: CPTII,,, | Performed by: NURSE PRACTITIONER

## 2025-04-24 PROCEDURE — 73502 X-RAY EXAM HIP UNI 2-3 VIEWS: CPT | Mod: TC,RT

## 2025-04-24 RX ORDER — MELOXICAM 7.5 MG/1
7.5 TABLET ORAL DAILY
Qty: 30 TABLET | Refills: 2 | Status: SHIPPED | OUTPATIENT
Start: 2025-04-24

## 2025-04-24 NOTE — PROGRESS NOTES
CC:  Hip pain  70 y.o. Female returns to clinic for a follow up visit regarding     ICD-10-CM ICD-9-CM   1. Right hip pain  M25.551 719.45   2. Primary osteoarthritis of both hips  M16.0 715.15       Patient had HEMAL 2/28/2024.   She states that her hip pain started back only about a month after that injection.   She reports her pain to be in her groin area.  She does ambulate with the use of a cane.  She has taken Mobic in the past and is currently taking naproxen.  Reports nothing really seems to be helping her pain.  Pain is affecting most of her daily activities as well as her sleep.          PAST MEDICAL HISTORY:   Past Medical History:   Diagnosis Date    Diabetes mellitus, type 2     HTN (hypertension)        PAST SURGICAL HISTORY:   Past Surgical History:   Procedure Laterality Date    HYSTERECTOMY      OOPHORECTOMY           PHYSICAL EXAMINATION:              Right Hip Exam     Inspection   Swelling: absent  Bruising: absent    Range of Motion   Extension:  normal   Flexion:  normal     Tests   Pain w/ forced internal rotation (NISHA): present  Pain w/ forced external rotation (FADIR): present  Circumduction test: positive  Log Roll: positive    Other   Sensation: normal      Vascular Exam     Right Pulses  Dorsalis Pedis:      2+            IMAGING:  X-Ray Hip 2 or 3 views Right with Pelvis when performed  Result Date: 5/5/2025  EXAMINATION: XR HIP WITH PELVIS WHEN PERFORMED 2 OR 3 VIEWS RIGHT CLINICAL HISTORY: Pain in right hip FINDINGS: AP pelvis and two views of the right hip with comparison radiograph 06/05/2024. There is no acute fracture, dislocation or destructive osseous lesion.  Severe joint space loss with bone-on-bone articulation of the right hip with questionable ankylosis at the superior joint.  Moderate joint space narrowing and osteophytosis of the left hip is unchanged from previous exam.  Pelvic enthesopathy.  No gross soft tissue abnormality.     1. Severe osteoarthropathy of the right  hip again observed as described with questionable ankylosis at the superior joint. 2. Moderate left hip osteoarthropathy. Electronically signed by: Alex Hunter Date:    05/05/2025 Time:    09:43    US Abdomen Complete  Result Date: 4/16/2025  EXAMINATION: US ABDOMEN COMPLETE CLINICAL HISTORY: Nausea TECHNIQUE: Complete abdominal ultrasound including grayscale, color and spectral Doppler sonographic analysis of the abdomen was performed. FINDINGS: Comparison to prior exams.  The visualized pancreas has normal echotexture, with no peripancreatic fluid collections. The liver is normal in size and echotexture, 17 cm in length, with no focal lesions or evidence of diffuse disease. The gallbladder is normal with no gallstones, wall thickening or pericholecystic fluid. The common duct measures 2 mm in diameter.  The spleen is normal in echotexture, measuring 8.2 cm in length. The right kidney measures 11 cm in length, with the left kidney 11.3 cm in length, both with normal cortical thickness and parenchymal echotexture, and without hydronephrosis, calculi or masses. The visualized abdominal aorta, juxtahepatic IVC and main portal vein are within normal limits. There is no ascites.     Negative abdominal ultrasound. Electronically signed by: James Jha Date:    04/16/2025 Time:    13:23         ASSESSMENT:      ICD-10-CM ICD-9-CM   1. Right hip pain  M25.551 719.45   2. Primary osteoarthritis of both hips  M16.0 715.15       PLAN:     -Findings and treatment options were discussed with the patient  -All questions answered  Natural history and expected course discussed. Questions answered.  Educational materials distributed.  NSAIDs per medication orders.  Discussed options today.  She did not get longer than a month relief from her intra-articular hip injection.  She would like to think about the option of total hip replacement and call back after she discusses it with her family.  We will start her on Mobic daily.   Stop naproxen    There are no Patient Instructions on file for this visit.    Orders Placed This Encounter   Procedures    X-Ray Hip 2 or 3 views Right with Pelvis when performed       Procedures

## 2025-06-10 ENCOUNTER — OFFICE VISIT (OUTPATIENT)
Dept: ORTHOPEDICS | Facility: CLINIC | Age: 70
End: 2025-06-10
Payer: MEDICARE

## 2025-06-10 VITALS — OXYGEN SATURATION: 98 % | SYSTOLIC BLOOD PRESSURE: 160 MMHG | HEART RATE: 66 BPM | DIASTOLIC BLOOD PRESSURE: 73 MMHG

## 2025-06-10 DIAGNOSIS — Z01.810 PRE-OPERATIVE CARDIOVASCULAR EXAMINATION: Primary | ICD-10-CM

## 2025-06-10 DIAGNOSIS — M25.551 PAIN OF RIGHT HIP: ICD-10-CM

## 2025-06-10 DIAGNOSIS — M16.11 PRIMARY OSTEOARTHRITIS OF RIGHT HIP: ICD-10-CM

## 2025-06-10 DIAGNOSIS — Z01.811 PRE-OPERATIVE RESPIRATORY EXAMINATION: ICD-10-CM

## 2025-06-10 DIAGNOSIS — Z01.812 PRE-OPERATIVE LABORATORY EXAMINATION: ICD-10-CM

## 2025-06-10 PROCEDURE — 1101F PT FALLS ASSESS-DOCD LE1/YR: CPT | Mod: CPTII,,, | Performed by: ORTHOPAEDIC SURGERY

## 2025-06-10 PROCEDURE — 99999 PR PBB SHADOW E&M-EST. PATIENT-LVL V: CPT | Mod: PBBFAC,,, | Performed by: ORTHOPAEDIC SURGERY

## 2025-06-10 PROCEDURE — 99215 OFFICE O/P EST HI 40 MIN: CPT | Mod: PBBFAC | Performed by: ORTHOPAEDIC SURGERY

## 2025-06-10 PROCEDURE — 1159F MED LIST DOCD IN RCRD: CPT | Mod: CPTII,,, | Performed by: ORTHOPAEDIC SURGERY

## 2025-06-10 PROCEDURE — 99214 OFFICE O/P EST MOD 30 MIN: CPT | Mod: S$PBB,,, | Performed by: ORTHOPAEDIC SURGERY

## 2025-06-10 PROCEDURE — 4010F ACE/ARB THERAPY RXD/TAKEN: CPT | Mod: CPTII,,, | Performed by: ORTHOPAEDIC SURGERY

## 2025-06-10 PROCEDURE — 3077F SYST BP >= 140 MM HG: CPT | Mod: CPTII,,, | Performed by: ORTHOPAEDIC SURGERY

## 2025-06-10 PROCEDURE — 3078F DIAST BP <80 MM HG: CPT | Mod: CPTII,,, | Performed by: ORTHOPAEDIC SURGERY

## 2025-06-10 PROCEDURE — 3288F FALL RISK ASSESSMENT DOCD: CPT | Mod: CPTII,,, | Performed by: ORTHOPAEDIC SURGERY

## 2025-06-10 NOTE — PROGRESS NOTES
Arianna Galicia has a mobility limitation that significantly impairs her ability to participate in one or more mobility related activities of daily living (MRADLs) such as toileting, feeding, dressing, grooming, and bathing in customary locations in the home.  The mobility limitation cannot be sufficiently resolved by the use of a cane or walker.   The use of a manual wheelchair will significantly improve the patients ability to participate in MRADLS and the patient will use it on regular basis in the home.  Arianna Galicia has expressed her willingness to use a manual wheelchair in the home. Patients upper body strength is sufficient for propulsion.  She also has a caregiver who is available, willing, and able to provide assistance with the wheelchair when needed.      Arianna requires a commode for home use because she is confined to one level of the home environment and there is no toilet on that level.    Aarons mobility limitation cannot be sufficiently resolved by the use of a cane. Her functional mobility deficit can be sufficiently resolved with the use of a Walker. Patient's mobility limitation significantly impairs their ability to participate in one of more activities of daily living.  The use of a Walker will significantly improve the patient's ability to participate in MRADLS and the patient will use it on regular basis in the home.

## 2025-06-10 NOTE — PROGRESS NOTES
CC:   Chief Complaint   Patient presents with    Right Hip - Pain        PREVIOUS INFO:  Trina Gonzales 04/24/2025    Patient had HEMAL 2/28/2024.   She states that her hip pain started back only about a month after that injection.   She reports her pain to be in her groin area.  She does ambulate with the use of a cane.  She has taken Mobic in the past and is currently taking naproxen.  Reports nothing really seems to be helping her pain.  Pain is affecting most of her daily activities as well as her sleep.      HISTORY:   6/10/2025    Arianna Galicia  is a 70 y.o. patient is having right greater than left hip pain also has some back issues.  But it has gotten to the point she has got have something done she really can not put her shoe on she is using a cane that is hard difficult for her to ambulate.  She is not on any blood thinners      PAST MEDICAL HISTORY:   Past Medical History:   Diagnosis Date    Diabetes mellitus, type 2     HTN (hypertension)           PAST SURGICAL HISTORY:   Past Surgical History:   Procedure Laterality Date    HYSTERECTOMY      OOPHORECTOMY            ALLERGIES: Review of patient's allergies indicates:  No Known Allergies     MEDICATIONS :  Current Medications[1]     SOCIAL HISTORY: Social History[2]     ROS    FAMILY HISTORY:   Family History   Problem Relation Name Age of Onset    Kidney cancer Mother      Diabetes Sister      Heart disease Sister            PHYSICAL EXAM:   Vitals:    06/10/25 1037   BP: (!) 160/73   Pulse: 66               There is no height or weight on file to calculate BMI.     In general, this is a well-developed, well-nourished female . The patient is alert, oriented and cooperative.      HEENT:  Normocephalic, atraumatic.  Extraocular movements are intact bilaterally.  The oropharynx is benign.       NECK:  Nontender with good range of motion.      PULMONARY: Respirations are even and non-labored.       CARDIOVASCULAR: Pulses regular by peripheral  palpation.       ABDOMEN:  Soft, non-tender, non-distended.        EXTREMITIES:  Elderly black female palpable dorsalis pedis pulse you can flex her hip up to 75° her leg goes into 40° of external rotation doing this and she has no internal rotation at all    Ortho Exam      RADIOGRAPHIC FINDINGS:  April 24, 2025 AP of the pelvis AP and lateral the right hip there is severe DJD involving the right hip bone-on-bone with cystic changes moderate DJD involving the left hip and significant DJD involving the lumbar spine.      .      IMPRESSION:  Severe DJD of the right hip she also has a least moderate DJD of the left hip and of the lumbar spine discussed with the that obviously right hip is the worst would recommend total hip replacement of that is not going to cure these other areas she does desire proceed discussed with the she will need help at home we will keep her in the hospital overnight then going home with home health and we will need family assistance        PLAN:  Right total hip replacement  Preoperative CT scan to further evaluate the acetabulum      I had a long discussion with the patient about treatment options, including operative and nonoperative treatments. We discussed pros and cons of each including risks pertinent to surgery including pain, infection, bleeding, damage to adjacent structures like nerves and blood vessels, failure to heal, need for future surgeries, stiffness, instability, loss of limb, anesthesia risks like stroke, blood clot, loss of life. We discussed the possibility of need for later hardware removal in the case that hardware was used. We discussed common and uncommon risks, and discussed patient specific factors that may increase the risks present with surgery. All questions were answered. The patient expressed understanding of the pros and cons of surgery and wanted to proceed with surgical treatment.                 Chinmay Reno III      (Subject to voice recognition error,  transcription service not allowed)           [1]   Current Outpatient Medications:     amLODIPine (NORVASC) 10 MG tablet, Take 10 mg by mouth once daily., Disp: , Rfl:     cetirizine (ZYRTEC) 10 MG tablet, Take 10 mg by mouth daily as needed., Disp: , Rfl:     HYDROcodone-acetaminophen (NORCO) 5-325 mg per tablet, Take 1 tablet by mouth every 6 (six) hours as needed for Pain., Disp: 12 tablet, Rfl: 0    lisinopriL-hydrochlorothiazide (PRINZIDE,ZESTORETIC) 20-25 mg Tab, Take 1 tablet by mouth once daily., Disp: , Rfl:     meloxicam (MOBIC) 7.5 MG tablet, Take 1 tablet (7.5 mg total) by mouth once daily., Disp: 30 tablet, Rfl: 2    metFORMIN (GLUCOPHAGE-XR) 500 MG ER 24hr tablet, , Disp: , Rfl:     ofloxacin (OCUFLOX) 0.3 % ophthalmic solution, , Disp: , Rfl:     ondansetron (ZOFRAN-ODT) 8 MG TbDL, , Disp: , Rfl:     rosuvastatin (CRESTOR) 20 MG tablet, Take 20 mg by mouth once daily. (Patient not taking: Reported on 6/10/2025), Disp: , Rfl:     sulfamethoxazole-trimethoprim 800-160mg (BACTRIM DS) 800-160 mg Tab, Take 1 tablet by mouth 2 (two) times daily. (Patient not taking: Reported on 6/10/2025), Disp: , Rfl:   [2]   Social History  Socioeconomic History    Marital status:    Tobacco Use    Smoking status: Never    Smokeless tobacco: Never   Substance and Sexual Activity    Alcohol use: Never    Drug use: Never     Social Drivers of Health     Housing Stability: High Risk (1/2/2025)    Received from [facility] SDOH Screening     In the past year, have you been unable to get any of the following when you really needed them? choose all that apply.: Utilities (electric, gas, and water)

## 2025-06-10 NOTE — PATIENT INSTRUCTIONS
Surgery Instructions     Your surgery is scheduled for 7/16/25 at [facility] on the   ground floor of the Ambulatory building. You should arrive at 6:00 AM at the   Ambulatory Care Center located at [address].    [facility] OP RT HIP: 7/1 @ 9:30- IMAGING CENTER     Pre Op Testing/ FU WITH DR. GARDNER : 7/1 @ 11:30    _X___ Lab  (1st floor clinic)   _X___ EKG  (2nd floor clinic)  _X___ Chest xray (Imaging Center)     Pre Op Clearance: DR ZARAGOZA CLEARANCE 7/9 @ 10:40     Our office will contact you the day before surgery with your arrival time.  DO NOT eat or drink anything after midnight the night before surgery (this includes gum, candy, chewing tobacco, etc).  You CAN NOT drive after surgery, please arrange for someone to drive you.  Bring all medication in their original bottles.  Bathe with Hibiclens the night or morning before your surgery.  The morning of your surgery ONLY take blood pressure, heart, acid reflux, or thyroid (if you take a morning dose) medication with a sip of water.   Be sure to have stopped your blood thinner medication at the appropriate time, as instructed.  Bring your C-Pap machine if you have one.  All jewelry, piercings, or false eyelashes MUST be removed prior to surgery.

## 2025-07-01 ENCOUNTER — OFFICE VISIT (OUTPATIENT)
Dept: ORTHOPEDICS | Facility: CLINIC | Age: 70
End: 2025-07-01
Payer: MEDICARE

## 2025-07-01 ENCOUNTER — HOSPITAL ENCOUNTER (OUTPATIENT)
Dept: RADIOLOGY | Facility: HOSPITAL | Age: 70
Discharge: HOME | End: 2025-07-01
Attending: ORTHOPAEDIC SURGERY
Payer: MEDICARE

## 2025-07-01 VITALS
BODY MASS INDEX: 38.3 KG/M2 | SYSTOLIC BLOOD PRESSURE: 153 MMHG | DIASTOLIC BLOOD PRESSURE: 69 MMHG | OXYGEN SATURATION: 96 % | HEIGHT: 66 IN | HEART RATE: 74 BPM | WEIGHT: 238.31 LBS

## 2025-07-01 DIAGNOSIS — M25.551 PAIN OF RIGHT HIP: ICD-10-CM

## 2025-07-01 DIAGNOSIS — M16.11 PRIMARY OSTEOARTHRITIS OF RIGHT HIP: Primary | ICD-10-CM

## 2025-07-01 PROCEDURE — 4010F ACE/ARB THERAPY RXD/TAKEN: CPT | Mod: CPTII,,, | Performed by: ORTHOPAEDIC SURGERY

## 2025-07-01 PROCEDURE — 73700 CT LOWER EXTREMITY W/O DYE: CPT | Mod: 26,RT,, | Performed by: INTERNAL MEDICINE

## 2025-07-01 PROCEDURE — 3077F SYST BP >= 140 MM HG: CPT | Mod: CPTII,,, | Performed by: ORTHOPAEDIC SURGERY

## 2025-07-01 PROCEDURE — 3288F FALL RISK ASSESSMENT DOCD: CPT | Mod: CPTII,,, | Performed by: ORTHOPAEDIC SURGERY

## 2025-07-01 PROCEDURE — 1159F MED LIST DOCD IN RCRD: CPT | Mod: CPTII,,, | Performed by: ORTHOPAEDIC SURGERY

## 2025-07-01 PROCEDURE — 99999 PR PBB SHADOW E&M-EST. PATIENT-LVL IV: CPT | Mod: PBBFAC,,, | Performed by: ORTHOPAEDIC SURGERY

## 2025-07-01 PROCEDURE — 3078F DIAST BP <80 MM HG: CPT | Mod: CPTII,,, | Performed by: ORTHOPAEDIC SURGERY

## 2025-07-01 PROCEDURE — 73700 CT LOWER EXTREMITY W/O DYE: CPT | Mod: TC,RT

## 2025-07-01 PROCEDURE — 3008F BODY MASS INDEX DOCD: CPT | Mod: CPTII,,, | Performed by: ORTHOPAEDIC SURGERY

## 2025-07-01 PROCEDURE — 99214 OFFICE O/P EST MOD 30 MIN: CPT | Mod: PBBFAC,25 | Performed by: ORTHOPAEDIC SURGERY

## 2025-07-01 PROCEDURE — 99213 OFFICE O/P EST LOW 20 MIN: CPT | Mod: S$PBB,,, | Performed by: ORTHOPAEDIC SURGERY

## 2025-07-01 PROCEDURE — 1101F PT FALLS ASSESS-DOCD LE1/YR: CPT | Mod: CPTII,,, | Performed by: ORTHOPAEDIC SURGERY

## 2025-07-01 NOTE — PROGRESS NOTES
CC:   Chief Complaint   Patient presents with    Right Hip - Follow-up     HAD CT THIS MORNING        PREVIOUS INFO:  Trina Gonzales 04/24/2025  Patient had HEMAL 2/28/2024.   She states that her hip pain started back only about a month after that injection.   She reports her pain to be in her groin area.  She does ambulate with the use of a cane.  She has taken Mobic in the past and is currently taking naproxen.  Reports nothing really seems to be helping her pain.  Pain is affecting most of her daily activities as well as her sleep.        HISTORY:   6/10/2025    Arianna Galicia  is a 70 y.o. patient is having right greater than left hip pain also has some back issues.  But it has gotten to the point she has got have something done she really can not put her shoe on she is using a cane that is hard difficult for her to ambulate.  She is not on any blood thinners            HISTORY:   7/1/2025    Arianna Galicia  is a 70 y.o. follow-up after CT of the right hip has severe DJD and some superior migration of the femoral head      PAST MEDICAL HISTORY:   Past Medical History:   Diagnosis Date    Diabetes mellitus, type 2     HTN (hypertension)           PAST SURGICAL HISTORY:   Past Surgical History:   Procedure Laterality Date    HYSTERECTOMY      OOPHORECTOMY            ALLERGIES: Review of patient's allergies indicates:  No Known Allergies     MEDICATIONS :  Current Medications[1]     SOCIAL HISTORY: Social History[2]     ROS    FAMILY HISTORY:   Family History   Problem Relation Name Age of Onset    Kidney cancer Mother      Diabetes Sister      Heart disease Sister            PHYSICAL EXAM:   Vitals:    07/01/25 0942   BP: (!) 153/69   Pulse: 74               Body mass index is 38.46 kg/m².     In general, this is a well-developed, well-nourished female . The patient is alert, oriented and cooperative.      HEENT:  Normocephalic, atraumatic.  Extraocular movements are intact bilaterally.  The oropharynx is  benign.       NECK:  Nontender with good range of motion.      PULMONARY: Respirations are even and non-labored.       CARDIOVASCULAR: Pulses regular by peripheral palpation.       ABDOMEN:  Soft, non-tender, non-distended.        EXTREMITIES:  Palpable dorsalis pedis pulse you can flex her hip to a 75° 40° external rotation and no internal rotation    Ortho Exam      RADIOGRAPHIC FINDINGS:  CT scan does show severe DJD does show large cystic areas in the superior acetabulum laterally no fractures appreciated  No official report today yet      .      IMPRESSION:  Right hip DJD with cystic areas  superiorly laterally in the acetabular and we are going to referred over to a [facility]    PLAN:  As above        No follow-ups on file.         Chinmay Reno III      (Subject to voice recognition error, transcription service not allowed)           [1]   Current Outpatient Medications:     amLODIPine (NORVASC) 10 MG tablet, Take 10 mg by mouth once daily., Disp: , Rfl:     cetirizine (ZYRTEC) 10 MG tablet, Take 10 mg by mouth daily as needed., Disp: , Rfl:     HYDROcodone-acetaminophen (NORCO) 5-325 mg per tablet, Take 1 tablet by mouth every 6 (six) hours as needed for Pain., Disp: 12 tablet, Rfl: 0    lisinopriL-hydrochlorothiazide (PRINZIDE,ZESTORETIC) 20-25 mg Tab, Take 1 tablet by mouth once daily., Disp: , Rfl:     meloxicam (MOBIC) 7.5 MG tablet, Take 1 tablet (7.5 mg total) by mouth once daily., Disp: 30 tablet, Rfl: 2    metFORMIN (GLUCOPHAGE-XR) 500 MG ER 24hr tablet, , Disp: , Rfl:     ofloxacin (OCUFLOX) 0.3 % ophthalmic solution, , Disp: , Rfl:     ondansetron (ZOFRAN-ODT) 8 MG TbDL, , Disp: , Rfl:     rosuvastatin (CRESTOR) 20 MG tablet, Take 20 mg by mouth once daily. (Patient not taking: Reported on 7/1/2025), Disp: , Rfl:     sulfamethoxazole-trimethoprim 800-160mg (BACTRIM DS) 800-160 mg Tab, Take 1 tablet by mouth 2 (two) times daily. (Patient not taking: Reported on 7/1/2025), Disp: , Rfl:   [2]    Social History  Socioeconomic History    Marital status:    Tobacco Use    Smoking status: Never    Smokeless tobacco: Never   Substance and Sexual Activity    Alcohol use: Never    Drug use: Never     Social Drivers of Health     Housing Stability: High Risk (1/2/2025)    Received from [facility] SDOH Screening     In the past year, have you been unable to get any of the following when you really needed them? choose all that apply.: Utilities (electric, gas, and water)

## 2025-07-07 ENCOUNTER — TELEPHONE (OUTPATIENT)
Dept: ORTHOPEDICS | Facility: CLINIC | Age: 70
End: 2025-07-07
Payer: MEDICARE

## 2025-07-07 NOTE — TELEPHONE ENCOUNTER
WILL GET DISC MADE. I WILL LEAVE IT AT  OF OUR OFFICE.    Copied from CRM #[id number]. Topic: General Inquiry - Patient Advice  >> Jul 7, 2025 10:24 AM Med Assistant Mary wrote:  Who Called: Arianna Galicia    Caller is requesting assistance/information from provider's office.        Preferred Method of Contact: Phone Call  Patient's Preferred Phone Number on File: [phone]   Best Call Back Number, if different:  Additional Information: needs disc to bring to sports med in [place]

## 2025-08-05 RX ORDER — MELOXICAM 7.5 MG/1
7.5 TABLET ORAL
Qty: 30 TABLET | Refills: 0 | Status: SHIPPED | OUTPATIENT
Start: 2025-08-05